# Patient Record
Sex: FEMALE | Race: BLACK OR AFRICAN AMERICAN | HISPANIC OR LATINO | Employment: FULL TIME | URBAN - METROPOLITAN AREA
[De-identification: names, ages, dates, MRNs, and addresses within clinical notes are randomized per-mention and may not be internally consistent; named-entity substitution may affect disease eponyms.]

---

## 2022-03-08 ENCOUNTER — APPOINTMENT (OUTPATIENT)
Dept: RADIOLOGY | Facility: MEDICAL CENTER | Age: 32
End: 2022-03-08
Attending: EMERGENCY MEDICINE
Payer: MEDICAID

## 2022-03-08 ENCOUNTER — HOSPITAL ENCOUNTER (EMERGENCY)
Facility: MEDICAL CENTER | Age: 32
End: 2022-03-08
Attending: EMERGENCY MEDICINE
Payer: MEDICAID

## 2022-03-08 VITALS
DIASTOLIC BLOOD PRESSURE: 79 MMHG | HEART RATE: 79 BPM | HEIGHT: 60 IN | BODY MASS INDEX: 31.21 KG/M2 | WEIGHT: 158.95 LBS | SYSTOLIC BLOOD PRESSURE: 122 MMHG | RESPIRATION RATE: 15 BRPM | TEMPERATURE: 98.1 F | OXYGEN SATURATION: 98 %

## 2022-03-08 DIAGNOSIS — R25.2 MUSCLE CRAMPS: ICD-10-CM

## 2022-03-08 LAB
ALBUMIN SERPL BCP-MCNC: 4.2 G/DL (ref 3.2–4.9)
ALBUMIN/GLOB SERPL: 1.4 G/DL
ALP SERPL-CCNC: 56 U/L (ref 30–99)
ALT SERPL-CCNC: 23 U/L (ref 2–50)
ANION GAP SERPL CALC-SCNC: 11 MMOL/L (ref 7–16)
AST SERPL-CCNC: 22 U/L (ref 12–45)
BASOPHILS # BLD AUTO: 0.4 % (ref 0–1.8)
BASOPHILS # BLD: 0.02 K/UL (ref 0–0.12)
BILIRUB SERPL-MCNC: 0.2 MG/DL (ref 0.1–1.5)
BUN SERPL-MCNC: 9 MG/DL (ref 8–22)
CA-I SERPL-SCNC: 1.2 MMOL/L (ref 1.1–1.3)
CALCIUM SERPL-MCNC: 9.1 MG/DL (ref 8.4–10.2)
CHLORIDE SERPL-SCNC: 102 MMOL/L (ref 96–112)
CO2 SERPL-SCNC: 22 MMOL/L (ref 20–33)
CREAT SERPL-MCNC: 0.38 MG/DL (ref 0.5–1.4)
EOSINOPHIL # BLD AUTO: 0.22 K/UL (ref 0–0.51)
EOSINOPHIL NFR BLD: 3.9 % (ref 0–6.9)
ERYTHROCYTE [DISTWIDTH] IN BLOOD BY AUTOMATED COUNT: 41.9 FL (ref 35.9–50)
GLOBULIN SER CALC-MCNC: 3.1 G/DL (ref 1.9–3.5)
GLUCOSE SERPL-MCNC: 92 MG/DL (ref 65–99)
HCT VFR BLD AUTO: 34.6 % (ref 37–47)
HGB BLD-MCNC: 11.6 G/DL (ref 12–16)
IMM GRANULOCYTES # BLD AUTO: 0.01 K/UL (ref 0–0.11)
IMM GRANULOCYTES NFR BLD AUTO: 0.2 % (ref 0–0.9)
LYMPHOCYTES # BLD AUTO: 2.44 K/UL (ref 1–4.8)
LYMPHOCYTES NFR BLD: 43.7 % (ref 22–41)
MAGNESIUM SERPL-MCNC: 2.1 MG/DL (ref 1.5–2.5)
MCH RBC QN AUTO: 26.7 PG (ref 27–33)
MCHC RBC AUTO-ENTMCNC: 33.5 G/DL (ref 33.6–35)
MCV RBC AUTO: 79.5 FL (ref 81.4–97.8)
MONOCYTES # BLD AUTO: 0.41 K/UL (ref 0–0.85)
MONOCYTES NFR BLD AUTO: 7.3 % (ref 0–13.4)
NEUTROPHILS # BLD AUTO: 2.48 K/UL (ref 2–7.15)
NEUTROPHILS NFR BLD: 44.5 % (ref 44–72)
NRBC # BLD AUTO: 0 K/UL
NRBC BLD-RTO: 0 /100 WBC
PLATELET # BLD AUTO: 313 K/UL (ref 164–446)
PMV BLD AUTO: 8.7 FL (ref 9–12.9)
POTASSIUM SERPL-SCNC: 3.8 MMOL/L (ref 3.6–5.5)
PROT SERPL-MCNC: 7.3 G/DL (ref 6–8.2)
RBC # BLD AUTO: 4.35 M/UL (ref 4.2–5.4)
SODIUM SERPL-SCNC: 135 MMOL/L (ref 135–145)
WBC # BLD AUTO: 5.6 K/UL (ref 4.8–10.8)

## 2022-03-08 PROCEDURE — 80053 COMPREHEN METABOLIC PANEL: CPT

## 2022-03-08 PROCEDURE — 85025 COMPLETE CBC W/AUTO DIFF WBC: CPT

## 2022-03-08 PROCEDURE — 36415 COLL VENOUS BLD VENIPUNCTURE: CPT

## 2022-03-08 PROCEDURE — 99283 EMERGENCY DEPT VISIT LOW MDM: CPT

## 2022-03-08 PROCEDURE — 82330 ASSAY OF CALCIUM: CPT

## 2022-03-08 PROCEDURE — 83735 ASSAY OF MAGNESIUM: CPT

## 2022-03-08 PROCEDURE — 93970 EXTREMITY STUDY: CPT

## 2022-03-08 RX ORDER — NAPROXEN 250 MG/1
500 TABLET ORAL PRN
Status: SHIPPED | COMMUNITY
End: 2022-04-04

## 2022-03-08 NOTE — ED PROVIDER NOTES
"ED Provider Note    CHIEF COMPLAINT  Chief Complaint   Patient presents with   • Leg Pain     Pt presents to the ed from home for bilateral intermittent leg cramping x 2-3 weeks          HPI    Primary care provider: No primary care provider on file.   History obtained from: Patient  History limited by: None     Salud Nathan is a 31 y.o. female who presents to the ED complaining of intermittent bilateral lower extremity muscle cramps that has been ongoing for about 2 to 3 weeks.  She reports that certain movements or positions seem to trigger the muscle cramps.  She was working on the computer tonight and had put her legs up and started cramping and she had to put her legs down.  She also reports that she \"loses feeling\" in her legs intermittently.  She did have a fracture to her right foot about 3 months ago but otherwise no recent injury or trauma.  No history of blood clots.  She denies possibility of pregnancy with history of tubal ligation.  She has not had any fever/chills/chest pain/shortness of breath or difficulty breathing/abdominal pain/nausea/vomiting/diarrhea/dysuria/rash.    REVIEW OF SYSTEMS  Please see HPI for pertinent positives/negatives.  All other systems reviewed and are negative.     PAST MEDICAL HISTORY  Past Medical History:   Diagnosis Date   • Depression    • Insomnia due to anxiety and fear    • Psychiatric disorder         SURGICAL HISTORY  Past Surgical History:   Procedure Laterality Date   • TUBAL LIGATION          SOCIAL HISTORY  Social History     Tobacco Use   • Smoking status: Current Every Day Smoker   • Smokeless tobacco: Never Used   Vaping Use   • Vaping Use: Never used   Substance and Sexual Activity   • Alcohol use: Not Currently   • Drug use: Never   • Sexual activity: Not on file        FAMILY HISTORY  History reviewed. No pertinent family history.     CURRENT MEDICATIONS  Home Medications     Reviewed by Jed Knott R.N. (Registered Nurse) on 03/08/22 at 0318  " Med List Status: Complete   Medication Last Dose Status   naproxen (NAPROSYN) 250 MG Tab  Active                 ALLERGIES  No Known Allergies     PHYSICAL EXAM  VITAL SIGNS: /79   Pulse 79   Temp 36.7 °C (98.1 °F) (Temporal)   Resp 15   Ht 1.524 m (5')   Wt 72.1 kg (158 lb 15.2 oz)   SpO2 98%   BMI 31.04 kg/m²  @[place][[id number]::@     Pulse ox interpretation: 100% I interpret this pulse ox as normal     Constitutional: Well developed, well nourished, alert in no apparent distress, nontoxic appearance    HENT: No external signs of trauma, normocephalic, mask on due to COVID-19 pandemic  Eyes: PERRL, conjunctiva without erythema, no discharge, no icterus    Neck: Soft and supple, trachea midline, no stridor, no tenderness, no LAD, no JVD, good ROM    Cardiovascular: Regular rate and rhythm, no murmurs/rubs/gallops, strong distal pulses and good perfusion    Thorax & Lungs: No respiratory distress, CTAB   Abdomen: Soft, nontender, nondistended, no guarding, no rebound, normal BS    Back: No CVAT    Extremities: No cyanosis, no edema/warmth/crepitus/tenseness/palpable cords, no gross deformity, good ROM, no apparent tenderness, intact distal pulses with brisk cap refill    Skin: Warm, dry, no pallor/cyanosis, no rash noted    Lymphatic: No lymphadenopathy noted    Neuro: A/O times 3, no focal deficits noted, ambulating without difficulty  Psychiatric: Cooperative      DIAGNOSTIC STUDIES / PROCEDURES        LABS  All labs reviewed by me.     Results for orders placed or performed during the hospital encounter of 03/08/22   CBC WITH DIFFERENTIAL   Result Value Ref Range    WBC 5.6 4.8 - 10.8 K/uL    RBC 4.35 4.20 - 5.40 M/uL    Hemoglobin 11.6 (L) 12.0 - 16.0 g/dL    Hematocrit 34.6 (L) 37.0 - 47.0 %    MCV 79.5 (L) 81.4 - 97.8 fL    MCH 26.7 (L) 27.0 - 33.0 pg    MCHC 33.5 (L) 33.6 - 35.0 g/dL    RDW 41.9 35.9 - 50.0 fL    Platelet Count 313 164 - 446 K/uL    MPV 8.7 (L) 9.0 - 12.9 fL    Neutrophils-Polys 44.50  44.00 - 72.00 %    Lymphocytes 43.70 (H) 22.00 - 41.00 %    Monocytes 7.30 0.00 - 13.40 %    Eosinophils 3.90 0.00 - 6.90 %    Basophils 0.40 0.00 - 1.80 %    Immature Granulocytes 0.20 0.00 - 0.90 %    Nucleated RBC 0.00 /100 WBC    Neutrophils (Absolute) 2.48 2.00 - 7.15 K/uL    Lymphs (Absolute) 2.44 1.00 - 4.80 K/uL    Monos (Absolute) 0.41 0.00 - 0.85 K/uL    Eos (Absolute) 0.22 0.00 - 0.51 K/uL    Baso (Absolute) 0.02 0.00 - 0.12 K/uL    Immature Granulocytes (abs) 0.01 0.00 - 0.11 K/uL    NRBC (Absolute) 0.00 K/uL   COMP METABOLIC PANEL   Result Value Ref Range    Sodium 135 135 - 145 mmol/L    Potassium 3.8 3.6 - 5.5 mmol/L    Chloride 102 96 - 112 mmol/L    Co2 22 20 - 33 mmol/L    Anion Gap 11.0 7.0 - 16.0    Glucose 92 65 - 99 mg/dL    Bun 9 8 - 22 mg/dL    Creatinine 0.38 (L) 0.50 - 1.40 mg/dL    Calcium 9.1 8.4 - 10.2 mg/dL    AST(SGOT) 22 12 - 45 U/L    ALT(SGPT) 23 2 - 50 U/L    Alkaline Phosphatase 56 30 - 99 U/L    Total Bilirubin 0.2 0.1 - 1.5 mg/dL    Albumin 4.2 3.2 - 4.9 g/dL    Total Protein 7.3 6.0 - 8.2 g/dL    Globulin 3.1 1.9 - 3.5 g/dL    A-G Ratio 1.4 g/dL   MAGNESIUM   Result Value Ref Range    Magnesium 2.1 1.5 - 2.5 mg/dL   IONIZED CALCIUM   Result Value Ref Range    Ionized Calcium 1.20 1.10 - 1.30 mmol/L   ESTIMATED GFR   Result Value Ref Range    GFR If African American >60 >60 mL/min/1.73 m 2    GFR If Non African American >60 >60 mL/min/1.73 m 2        RADIOLOGY  The radiologist's interpretation of all radiological studies have been reviewed by me.     US-EXTREMITY VENOUS LOWER BILAT   Final Result      No evidence of bilateral lower extremity deep venous thrombosis.                         COURSE & MEDICAL DECISION MAKING  Nursing notes, VS, PMSFHx reviewed in chart.     Review of past medical records shows the patient without prior visits to this ED.      Differential diagnoses considered include but are not limited to: Arthritis, bursitis, tendonitis, DVT/vascular occlusion,  radiculopathy, neuropathy, electrolyte abnormality       History and physical exam as above.  Labs are fairly unremarkable except for mild anemia.  Bilateral lower extremity ultrasound without evidence for DVT.  I discussed the findings with the patient.  This is a pleasant well-appearing patient in no acute distress and nontoxic in appearance and has been clinically stable during her ED stay.  At this point, no apparent emergent condition such as DVT/vascular occlusion or evidence for infection or compartment syndrome.  Patient was advised on outpatient follow-up and given return to ED precautions.  She verbalized understanding and agreed with plan of care with no further questions or concerns.      The patient is referred to a primary physician for blood pressure management, diabetic screening, and for all other preventative health concerns.       FINAL IMPRESSION  1. Muscle cramps Acute          DISPOSITION  Patient will be discharged home in stable condition.       FOLLOW UP  Please follow-up with your doctor    Call today      [facility], Emergency Dept  [address]  [place] [address]  [phone]    If symptoms worsen         OUTPATIENT MEDICATIONS  Discharge Medication List as of 3/8/2022  4:36 AM             Electronically signed by: Amaury Smith D.O., 3/8/2022 3:18 AM      Portions of this record were made with voice recognition software.  Despite my review, spelling/grammar/context errors may still remain.  Interpretation of this chart should be taken in this context.

## 2022-04-01 ENCOUNTER — HOSPITAL ENCOUNTER (EMERGENCY)
Facility: MEDICAL CENTER | Age: 32
End: 2022-04-01
Payer: MEDICAID

## 2022-04-01 VITALS
HEART RATE: 78 BPM | DIASTOLIC BLOOD PRESSURE: 84 MMHG | HEIGHT: 65 IN | BODY MASS INDEX: 26.82 KG/M2 | TEMPERATURE: 97 F | SYSTOLIC BLOOD PRESSURE: 140 MMHG | OXYGEN SATURATION: 98 % | WEIGHT: 161 LBS | RESPIRATION RATE: 19 BRPM

## 2022-04-01 DIAGNOSIS — M79.641 BILATERAL HAND PAIN: ICD-10-CM

## 2022-04-01 DIAGNOSIS — M79.642 BILATERAL HAND PAIN: ICD-10-CM

## 2022-04-01 PROCEDURE — 99283 EMERGENCY DEPT VISIT LOW MDM: CPT

## 2022-04-01 PROCEDURE — A9270 NON-COVERED ITEM OR SERVICE: HCPCS | Performed by: STUDENT IN AN ORGANIZED HEALTH CARE EDUCATION/TRAINING PROGRAM

## 2022-04-01 PROCEDURE — 700102 HCHG RX REV CODE 250 W/ 637 OVERRIDE(OP): Performed by: STUDENT IN AN ORGANIZED HEALTH CARE EDUCATION/TRAINING PROGRAM

## 2022-04-01 RX ORDER — IBUPROFEN 600 MG/1
600 TABLET ORAL ONCE
Status: COMPLETED | OUTPATIENT
Start: 2022-04-01 | End: 2022-04-01

## 2022-04-01 RX ADMIN — IBUPROFEN 600 MG: 600 TABLET ORAL at 02:37

## 2022-04-01 ASSESSMENT — PAIN DESCRIPTION - PAIN TYPE
TYPE: ACUTE PAIN;CHRONIC PAIN
TYPE: ACUTE PAIN;CHRONIC PAIN
TYPE: ACUTE PAIN

## 2022-04-01 ASSESSMENT — FIBROSIS 4 INDEX: FIB4 SCORE: 0.45

## 2022-04-01 NOTE — ED NOTES
Patient fitted with bilateral velcro wrist splints. Patient given PO ibuprofen per MAR.    Discharge instructions reviewed with patient. AVS signed by patient. No new prescriptions. Patient understands need for follow-up appointments with healthcare team and to return to ED for worsening symptoms. All questions answered at this time. Patient ambulated to exit with belongings. Patient in stable condition with no signs of distress. Patient agreeable to discharge instructions.

## 2022-04-01 NOTE — DISCHARGE INSTRUCTIONS
Take the following medications for pain/fever at home:  Acetaminophen (Tylenol): Take 650 mg (2 regular strength) every 6 hours. Do not take more than 3,000mg in a 24 hour period.   Ibuprofen: Take 400-600 mg (2-3 regular strength) every 6 hours. Take with food.   Alternate the two medications and you can take one of them every 3 hours.       As we discussed, please use the splint we have provided for support of your right hand to see if this improves the symptoms.  You may also get another splint at the [facility] if you feel like the splint is helping the right hand to use on the left hand.    Please schedule follow-up appointment with orthopedics for further evaluation of your symptoms.  Return to the emergency department if you develop fevers, joint swelling or other concerns.  We have also placed a referral to help you establish a primary care doctor for basic labs and evaluation if orthopedics is more concerned for a rheumatologic problem.

## 2022-04-01 NOTE — ED PROVIDER NOTES
ED Provider Note    CHIEF COMPLAINT  Chief Complaint   Patient presents with   • Digit Pain     Left hand joints hurt when flexed, pt denies any injury to area          HPI  Salud Nathan is a 31 y.o. female who presents with bilateral hand pain that has been slowly worsening over the last 3 weeks.  Patient states pain is intermittent, worse on the right but over the last several days has also started hurting on the left.  She states the pain is diffuse across her hand joints, is not sure if she has had swelling.  Pain is worse in the evenings.  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  She denies fevers.  She reports there is a family history of arthritis but she is not sure what kind.  She denies any chills or night sweats.  Denies trauma to her hands.  Patient states she said does sometimes sleep on her arms at night.  Denies new or worsening neck or back pain.  Denies pain in any other joints at this time.  States the pain is the worst in the fourth finger, reports sensation of mild tingling at times of all fingers.    Of note on chart review patient was seen in the emergency department for leg cramping 3/8/2022 at the time had basic labs done which showed no significant electrolyte abnormality and normal renal function.    REVIEW OF SYSTEMS  See HPI for further details. All other systems are negative.     PAST MEDICAL HISTORY   has a past medical history of Depression, Insomnia due to anxiety and fear, and Psychiatric disorder.    SOCIAL HISTORY  Social History     Tobacco Use   • Smoking status: Current Every Day Smoker   • Smokeless tobacco: Never Used   Vaping Use   • Vaping Use: Never used   Substance and Sexual Activity   • Alcohol use: Not Currently   • Drug use: Never   • Sexual activity: Not on file       SURGICAL HISTORY   has a past surgical history that includes tubal ligation.    CURRENT MEDICATIONS  Home Medications    **Home  "medications have not yet been reviewed for this encounter**         ALLERGIES  No Known Allergies    PHYSICAL EXAM  VITAL SIGNS: /84   Pulse 79   Temp 36.1 °C (97 °F) (Temporal)   Resp 19   Ht 1.651 m (5' 5\")   Wt 73 kg (161 lb)   SpO2 98%   BMI 26.79 kg/m²    Pulse ox interpretation: I interpret this pulse ox as normal.  Constitutional: Alert in no apparent distress.  HENT: Normocephalic, Atraumatic, Bilateral external ears normal. Nose normal.   Eyes: Pupils are equal and reactive. Conjunctiva normal, non-icteric.   Heart: Regular rate and rythm, no murmurs.    Lungs: Clear to auscultation bilaterally.  MSK: No obvious joint swelling or erythema of bilateral hands.  No focal tenderness.  Moves all fingers normally. Median, radian, ulnar, AIN movement intact bilaterally.  Intact distal sensation of all fingers.  Slight weakness bilaterally with thumb and fifth digit opposition.  Patient has difficulty with Phalen test.  No worsening of symptoms with Tinel test. 2+ todd radial pulse.  Skin: Warm, Dry, No erythema, No rash.   Neurologic: Alert, Grossly non-focal.   Psychiatric: Affect normal, Judgment normal, Mood normal, Appears appropriate and not intoxicated.           COURSE & MEDICAL DECISION MAKING  Pertinent Labs & Imaging studies reviewed. (See chart for details)    31-year-old female presenting with 3 weeks of slowly worsening bilateral hand pain.  Initially started on her dominant hand.  normal vital signs.  On exam she does have slight weakness with opposition is most concerning for possible developing nerve impingement or carpal tunnel syndrome.  Patient given wrist support for the worse hand and instructed on Tylenol and ibuprofen at home.  At this time there is no fevers, significant swelling to make me concerned for septic joint.  Patient does have family history of arthritis, but at this time no labs indicated as this would not  would be best with supportive care with " Tylenol and ibuprofen at this time and outpatient follow-up for further evaluation.  Considered electrolyte abnormality, but patient was just seen in the ED 3 weeks ago for muscle cramping at the time had normal electrolytes.  Feel no indication for repeat at this time.  Referrals placed for both PCP and orthopedics.  There has been no trauma to her hands, at this time feel x-rays are not indicated.  Discharged home with return precautions.    The patient will not drink alcohol nor drive with prescribed medications. The patient will return for worsening symptoms and is stable at the time of discharge. The patient verbalizes understanding and will comply.    FINAL IMPRESSION  1. Bilateral hand pain  Referral to Orthopedics    Referral to establish with [facility] PCP            Electronically signed by: Vidya Espinoza M.D., 4/1/2022 2:11 AM

## 2022-04-01 NOTE — ED TRIAGE NOTES
"Chief Complaint   Patient presents with   • Digit Pain     Left hand joints hurt when flexed, pt denies any injury to area        /89   Pulse 79   Temp 36.1 °C (97 °F) (Temporal)   Resp 20   Ht 1.651 m (5' 5\")   Wt 73 kg (161 lb)   SpO2 98%   BMI 26.79 kg/m²       "

## 2022-04-03 SDOH — ECONOMIC STABILITY: TRANSPORTATION INSECURITY
IN THE PAST 12 MONTHS, HAS THE LACK OF TRANSPORTATION KEPT YOU FROM MEDICAL APPOINTMENTS OR FROM GETTING MEDICATIONS?: NO

## 2022-04-03 SDOH — HEALTH STABILITY: PHYSICAL HEALTH: ON AVERAGE, HOW MANY DAYS PER WEEK DO YOU ENGAGE IN MODERATE TO STRENUOUS EXERCISE (LIKE A BRISK WALK)?: 0 DAYS

## 2022-04-03 SDOH — ECONOMIC STABILITY: FOOD INSECURITY: WITHIN THE PAST 12 MONTHS, YOU WORRIED THAT YOUR FOOD WOULD RUN OUT BEFORE YOU GOT MONEY TO BUY MORE.: NEVER TRUE

## 2022-04-03 SDOH — HEALTH STABILITY: PHYSICAL HEALTH: ON AVERAGE, HOW MANY MINUTES DO YOU ENGAGE IN EXERCISE AT THIS LEVEL?: 0 MIN

## 2022-04-03 SDOH — ECONOMIC STABILITY: HOUSING INSECURITY
IN THE LAST 12 MONTHS, WAS THERE A TIME WHEN YOU DID NOT HAVE A STEADY PLACE TO SLEEP OR SLEPT IN A SHELTER (INCLUDING NOW)?: NO

## 2022-04-03 SDOH — ECONOMIC STABILITY: HOUSING INSECURITY: IN THE LAST 12 MONTHS, HOW MANY PLACES HAVE YOU LIVED?: 2

## 2022-04-03 SDOH — ECONOMIC STABILITY: INCOME INSECURITY: HOW HARD IS IT FOR YOU TO PAY FOR THE VERY BASICS LIKE FOOD, HOUSING, MEDICAL CARE, AND HEATING?: SOMEWHAT HARD

## 2022-04-03 SDOH — ECONOMIC STABILITY: INCOME INSECURITY: IN THE LAST 12 MONTHS, WAS THERE A TIME WHEN YOU WERE NOT ABLE TO PAY THE MORTGAGE OR RENT ON TIME?: NO

## 2022-04-03 SDOH — ECONOMIC STABILITY: TRANSPORTATION INSECURITY
IN THE PAST 12 MONTHS, HAS LACK OF TRANSPORTATION KEPT YOU FROM MEETINGS, WORK, OR FROM GETTING THINGS NEEDED FOR DAILY LIVING?: NO

## 2022-04-03 SDOH — ECONOMIC STABILITY: TRANSPORTATION INSECURITY
IN THE PAST 12 MONTHS, HAS LACK OF RELIABLE TRANSPORTATION KEPT YOU FROM MEDICAL APPOINTMENTS, MEETINGS, WORK OR FROM GETTING THINGS NEEDED FOR DAILY LIVING?: NO

## 2022-04-03 SDOH — ECONOMIC STABILITY: FOOD INSECURITY: WITHIN THE PAST 12 MONTHS, THE FOOD YOU BOUGHT JUST DIDN'T LAST AND YOU DIDN'T HAVE MONEY TO GET MORE.: NEVER TRUE

## 2022-04-03 SDOH — HEALTH STABILITY: MENTAL HEALTH
STRESS IS WHEN SOMEONE FEELS TENSE, NERVOUS, ANXIOUS, OR CAN'T SLEEP AT NIGHT BECAUSE THEIR MIND IS TROUBLED. HOW STRESSED ARE YOU?: RATHER MUCH

## 2022-04-03 ASSESSMENT — SOCIAL DETERMINANTS OF HEALTH (SDOH)
DO YOU BELONG TO ANY CLUBS OR ORGANIZATIONS SUCH AS CHURCH GROUPS UNIONS, FRATERNAL OR ATHLETIC GROUPS, OR SCHOOL GROUPS?: NO
WITHIN THE PAST 12 MONTHS, YOU WORRIED THAT YOUR FOOD WOULD RUN OUT BEFORE YOU GOT THE MONEY TO BUY MORE: NEVER TRUE
HOW MANY DRINKS CONTAINING ALCOHOL DO YOU HAVE ON A TYPICAL DAY WHEN YOU ARE DRINKING: 1 OR 2
HOW OFTEN DO YOU HAVE A DRINK CONTAINING ALCOHOL: MONTHLY OR LESS
IN A TYPICAL WEEK, HOW MANY TIMES DO YOU TALK ON THE PHONE WITH FAMILY, FRIENDS, OR NEIGHBORS?: THREE TIMES A WEEK
HOW OFTEN DO YOU GET TOGETHER WITH FRIENDS OR RELATIVES?: ONCE A WEEK
ARE YOU MARRIED, WIDOWED, DIVORCED, SEPARATED, NEVER MARRIED, OR LIVING WITH A PARTNER?: NEVER MARRIED
DO YOU BELONG TO ANY CLUBS OR ORGANIZATIONS SUCH AS CHURCH GROUPS UNIONS, FRATERNAL OR ATHLETIC GROUPS, OR SCHOOL GROUPS?: NO
HOW OFTEN DO YOU ATTENT MEETINGS OF THE CLUB OR ORGANIZATION YOU BELONG TO?: NEVER
ARE YOU MARRIED, WIDOWED, DIVORCED, SEPARATED, NEVER MARRIED, OR LIVING WITH A PARTNER?: NEVER MARRIED
HOW OFTEN DO YOU ATTEND CHURCH OR RELIGIOUS SERVICES?: NEVER
IN A TYPICAL WEEK, HOW MANY TIMES DO YOU TALK ON THE PHONE WITH FAMILY, FRIENDS, OR NEIGHBORS?: THREE TIMES A WEEK
HOW OFTEN DO YOU ATTENT MEETINGS OF THE CLUB OR ORGANIZATION YOU BELONG TO?: NEVER
HOW OFTEN DO YOU HAVE SIX OR MORE DRINKS ON ONE OCCASION: NEVER
HOW HARD IS IT FOR YOU TO PAY FOR THE VERY BASICS LIKE FOOD, HOUSING, MEDICAL CARE, AND HEATING?: SOMEWHAT HARD
HOW OFTEN DO YOU GET TOGETHER WITH FRIENDS OR RELATIVES?: ONCE A WEEK
HOW OFTEN DO YOU ATTEND CHURCH OR RELIGIOUS SERVICES?: NEVER

## 2022-04-03 ASSESSMENT — LIFESTYLE VARIABLES
HOW OFTEN DO YOU HAVE A DRINK CONTAINING ALCOHOL: MONTHLY OR LESS
HOW OFTEN DO YOU HAVE SIX OR MORE DRINKS ON ONE OCCASION: NEVER
HOW MANY STANDARD DRINKS CONTAINING ALCOHOL DO YOU HAVE ON A TYPICAL DAY: 1 OR 2

## 2022-04-04 ENCOUNTER — OFFICE VISIT (OUTPATIENT)
Dept: INTERNAL MEDICINE | Facility: OTHER | Age: 32
End: 2022-04-04
Attending: STUDENT IN AN ORGANIZED HEALTH CARE EDUCATION/TRAINING PROGRAM
Payer: MEDICAID

## 2022-04-04 VITALS
WEIGHT: 158.2 LBS | TEMPERATURE: 98.6 F | OXYGEN SATURATION: 95 % | HEART RATE: 92 BPM | BODY MASS INDEX: 31.06 KG/M2 | DIASTOLIC BLOOD PRESSURE: 60 MMHG | SYSTOLIC BLOOD PRESSURE: 110 MMHG | HEIGHT: 60 IN

## 2022-04-04 DIAGNOSIS — Z12.4 CERVICAL CANCER SCREENING: ICD-10-CM

## 2022-04-04 DIAGNOSIS — F41.8 DEPRESSION WITH ANXIETY: ICD-10-CM

## 2022-04-04 DIAGNOSIS — M25.561 ACUTE PAIN OF RIGHT KNEE: ICD-10-CM

## 2022-04-04 DIAGNOSIS — E66.9 OBESITY (BMI 30-39.9): ICD-10-CM

## 2022-04-04 DIAGNOSIS — M79.641 RIGHT HAND PAIN: ICD-10-CM

## 2022-04-04 DIAGNOSIS — Z23 NEED FOR PNEUMOCOCCAL VACCINE: ICD-10-CM

## 2022-04-04 DIAGNOSIS — F17.210 CIGARETTE NICOTINE DEPENDENCE WITHOUT COMPLICATION: ICD-10-CM

## 2022-04-04 PROCEDURE — 99203 OFFICE O/P NEW LOW 30 MIN: CPT | Mod: 25,GE | Performed by: GENERAL PRACTICE

## 2022-04-04 PROCEDURE — 90732 PPSV23 VACC 2 YRS+ SUBQ/IM: CPT | Performed by: STUDENT IN AN ORGANIZED HEALTH CARE EDUCATION/TRAINING PROGRAM

## 2022-04-04 PROCEDURE — 90471 IMMUNIZATION ADMIN: CPT | Performed by: STUDENT IN AN ORGANIZED HEALTH CARE EDUCATION/TRAINING PROGRAM

## 2022-04-04 RX ORDER — IBUPROFEN 200 MG
200 TABLET ORAL EVERY 6 HOURS PRN
COMMUNITY

## 2022-04-04 RX ORDER — HYDROXYZINE HYDROCHLORIDE 25 MG/1
25 TABLET, FILM COATED ORAL 2 TIMES DAILY PRN
Qty: 30 TABLET | Refills: 1 | Status: SHIPPED | OUTPATIENT
Start: 2022-04-04 | End: 2022-08-12

## 2022-04-04 ASSESSMENT — ANXIETY QUESTIONNAIRES
3. WORRYING TOO MUCH ABOUT DIFFERENT THINGS: NOT AT ALL
7. FEELING AFRAID AS IF SOMETHING AWFUL MIGHT HAPPEN: NOT AT ALL
IF YOU CHECKED OFF ANY PROBLEMS ON THIS QUESTIONNAIRE, HOW DIFFICULT HAVE THESE PROBLEMS MADE IT FOR YOU TO DO YOUR WORK, TAKE CARE OF THINGS AT HOME, OR GET ALONG WITH OTHER PEOPLE: NOT DIFFICULT AT ALL
1. FEELING NERVOUS, ANXIOUS, OR ON EDGE: SEVERAL DAYS
5. BEING SO RESTLESS THAT IT IS HARD TO SIT STILL: NOT AT ALL
4. TROUBLE RELAXING: NOT AT ALL
GAD7 TOTAL SCORE: 2
6. BECOMING EASILY ANNOYED OR IRRITABLE: SEVERAL DAYS
2. NOT BEING ABLE TO STOP OR CONTROL WORRYING: NOT AT ALL

## 2022-04-04 ASSESSMENT — ENCOUNTER SYMPTOMS
CONSTIPATION: 0
FEVER: 0
FALLS: 0
VOMITING: 0
BLURRED VISION: 0
SHORTNESS OF BREATH: 0
ABDOMINAL PAIN: 0
WEIGHT LOSS: 0
NERVOUS/ANXIOUS: 1
DIARRHEA: 0
CHILLS: 0
HEADACHES: 0
DIZZINESS: 0
NAUSEA: 0
WHEEZING: 0
COUGH: 0
WEAKNESS: 0
MYALGIAS: 0
DOUBLE VISION: 0
PALPITATIONS: 0
HEARTBURN: 0
DEPRESSION: 0

## 2022-04-04 ASSESSMENT — LIFESTYLE VARIABLES: SUBSTANCE_ABUSE: 0

## 2022-04-04 ASSESSMENT — FIBROSIS 4 INDEX: FIB4 SCORE: 0.45

## 2022-04-04 ASSESSMENT — PATIENT HEALTH QUESTIONNAIRE - PHQ9: CLINICAL INTERPRETATION OF PHQ2 SCORE: 0

## 2022-04-04 NOTE — PROGRESS NOTES
New Patient to Establish    Reason to establish: New patient to establish    CC: new    HPI: 31 year old female,presents to establish care. Was previously seen in [place] by PCP, but has not been seen for two years.      Smoker: 1/2 PPD for 13 years.not ready to quit.       Pap smear:has not had a pap smear in the past 3 years.history of HPV with LEEP 2015.  Tubal ligation:2015  Tdap:due  Pneumovax: due    Has had right pain for 1month, no trauma, 2nd,3rd,4th fingersr MTP joint. takne Tylenol and Motrin without relief.    Right knee pain for one month.no trauma.not seen for this.worse after work standing working at [employer].    Depression with anxiety: using as needed. Previously on Benzodiazepines. Has been on Hydroxyzine in the past that worked better. GAD7: 2, PHQ9-negative.    Patient Active Problem List    Diagnosis Date Noted   • Depression with anxiety 04/04/2022   • Cigarette nicotine dependence without complication 04/04/2022   • Obesity (BMI 30-39.9) 04/04/2022       Past Medical History:   Diagnosis Date   • Depression    • Insomnia due to anxiety and fear    • Psychiatric disorder        Current Outpatient Medications   Medication Sig Dispense Refill   • ibuprofen (MOTRIN) 200 MG Tab Take 200 mg by mouth every 6 hours as needed.     • hydrOXYzine HCl (ATARAX) 25 MG Tab Take 1 Tablet by mouth 2 times a day as needed for Itching. 30 Tablet 1     No current facility-administered medications for this visit.       Allergies as of 04/04/2022   • (No Known Allergies)       Social History     Tobacco Use   • Smoking status: Current Every Day Smoker     Packs/day: 0.50     Years: 13.00     Pack years: 6.50     Types: Cigarettes   • Smokeless tobacco: Never Used   • Tobacco comment: 1/2 pack a day, states could be less   Vaping Use   • Vaping Use: Never used   Substance Use Topics   • Alcohol use: Yes     Comment: occasional, socially   • Drug use: Never       History reviewed. No pertinent family  history.    Past Surgical History:   Procedure Laterality Date   • LEEP  2015   • TUBAL LIGATION           Review of Systems   Constitutional: Negative for chills, fever, malaise/fatigue and weight loss.   Eyes: Negative for blurred vision and double vision.   Respiratory: Negative for cough, shortness of breath and wheezing.    Cardiovascular: Negative for chest pain and palpitations.   Gastrointestinal: Negative for abdominal pain, constipation, diarrhea, heartburn, nausea and vomiting.   Genitourinary: Negative for dysuria, frequency and urgency.   Musculoskeletal: Positive for joint pain. Negative for falls and myalgias.   Skin: Negative for rash.   Neurological: Negative for dizziness, weakness and headaches.   Psychiatric/Behavioral: Negative for depression, substance abuse and suicidal ideas. The patient is nervous/anxious.          /60 (BP Location: Left arm, Patient Position: Sitting, BP Cuff Size: Adult)   Pulse 92   Temp 37 °C (98.6 °F) (Temporal)   Ht 1.524 m (5')   Wt 71.8 kg (158 lb 3.2 oz)   SpO2 95%   BMI 30.90 kg/m²       Physical Exam  Physical Exam  Vitals and nursing note reviewed.   Constitutional:       General: She is not in acute distress.     Appearance: Normal appearance. She is not ill-appearing.   HENT:      Head: Normocephalic and atraumatic.   Eyes:      Conjunctiva/sclera: Conjunctivae normal.   Cardiovascular:      Rate and Rhythm: Normal rate and regular rhythm.      Pulses: Normal pulses.      Heart sounds: Normal heart sounds. No murmur heard.    No friction rub. No gallop.   Pulmonary:      Effort: Pulmonary effort is normal. No respiratory distress.      Breath sounds: Normal breath sounds. No wheezing, rhonchi or rales.   Musculoskeletal:      Right hand: Tenderness and bony tenderness present. No swelling, deformity or lacerations. Normal range of motion. Normal strength. Normal sensation. There is no disruption of two-point discrimination. Normal capillary refill.  Normal pulse.      Cervical back: Neck supple. No rigidity or tenderness.      Right knee: No deformity, effusion, erythema, ecchymosis, lacerations, bony tenderness or crepitus. Normal range of motion. No tenderness. No LCL laxity, MCL laxity, ACL laxity or PCL laxity. Abnormal patellar mobility. Normal alignment and normal meniscus. Normal pulse.      Instability Tests: Anterior drawer test negative. Posterior drawer test negative. Medial Alexander test negative and lateral Alexander test negative.      Comments: Right hand:mild TTP dorsal and volar aspect of the 2nd,3rd,4th fingers.   Skin:     General: Skin is dry.      Capillary Refill: Capillary refill takes less than 2 seconds.      Findings: No rash.   Neurological:      Mental Status: She is alert and oriented to person, place, and time. Mental status is at baseline.   Psychiatric:         Mood and Affect: Mood normal.         Behavior: Behavior normal.         Note: I have reviewed all pertinent labs and diagnostic tests associated with this visit with specific comments listed under the assessment and plan below    Assessment and Plan    1. Cigarette nicotine dependence without complication  Currently smokes/uses: 1/2 PPD for 13 years. Previous attempts to quit: none> not ready to quit at this time.Will readdress at next appointment.  Spent 3 minutes spent counseling patient regarding quitting smoking to improve overall health.     2. Cervical cancer screening  - Referral to Gynecology    3. Need for pneumococcal vaccine  - PneumoVax (PPSV23) =>3yo    4. Obesity (BMI 30-39.9)  Discussed lifestyle changes  - Lipid Profile; Future  - TSH WITH REFLEX TO FT4; Future  - Comp Metabolic Panel; Future  - CBC WITH DIFFERENTIAL; Future  - HEMOGLOBIN A1C; Future    5. Right hand pain  - DX-HAND 3+ RIGHT; Future  - VELMA W/REFLEX IF POSITIVE  - RHEUMATOID ARTHRITIS FACTOR; Future    6. Acute pain of right knee  suspect mild synovitis or Patellofemoral syndrome, mild  OA.recommend leg elevation after work,ice for 20 minutes, Tylenol 500mg every six hours as needed,avoid NSAIDs.Advised to get a sleeve knee brace.will get imaging.  - DX-KNEE COMPLETE 4+ RIGHT; Future    7. Depression with anxiety  Mild. No SSRI or Psychology referral indicated at this time.  - hydrOXYzine HCl (ATARAX) 25 MG Tab; Take 1 Tablet by mouth 2 times a day as needed for Itching.  Dispense: 30 Tablet; Refill: 1          Followup: Return in about 3 months (around 7/4/2022).        Signed by: Cristo Rudolph Jr., M.D.

## 2022-04-04 NOTE — PATIENT INSTRUCTIONS
"-fasting labs to get  -xray for right knee and hand  -referral to Gynecology. Call our clinic if you have not received an approval in the mail or by phone.  -recommend Tdap booster. Given every 10 years.  -recommend to get a sleeve knee brace  -avoid Ibuprofen or Motrin,Naproxen for pain  -take Tylenol 500mg every six hours as needed for pain  -refilled Hydroxyzine for anxiety    https://www.cdc.gov/vaccines/hcp/vis/vis-statements/tdap.pdf\">   Tdap Vaccine (Tetanus, Diphtheria and Pertussis): What You Need to Know  1. Why get vaccinated?  Tetanus, diphtheria and pertussis are very serious diseases. Tdap vaccine can protect us from these diseases. And, Tdap vaccine given to pregnant women can protect  babies against pertussis..  TETANUS (Lockjaw) is rare in the [place] today. It causes painful muscle tightening and stiffness, usually all over the body.  · It can lead to tightening of muscles in the head and neck so you can't open your mouth, swallow, or sometimes even breathe. Tetanus kills about 1 out of 10 people who are infected even after receiving the best medical care.  DIPHTHERIA is also rare in the [place] today. It can cause a thick coating to form in the back of the throat.  · It can lead to breathing problems, heart failure, paralysis, and death.  PERTUSSIS (Whooping Cough) causes severe coughing spells, which can cause difficulty breathing, vomiting and disturbed sleep.  · It can also lead to weight loss, incontinence, and rib fractures. Up to 2 in 100 adolescents and 5 in 100 adults with pertussis are hospitalized or have complications, which could include pneumonia or death.  These diseases are caused by bacteria. Diphtheria and pertussis are spread from person to person through secretions from coughing or sneezing. Tetanus enters the body through cuts, scratches, or wounds.  Before vaccines, as many as 200,000 cases of diphtheria, 200,000 cases of pertussis, and hundreds of cases of " tetanus, were reported in the [place] each year. Since vaccination began, reports of cases for tetanus and diphtheria have dropped by about 99% and for pertussis by about 80%.  2. Tdap vaccine  Tdap vaccine can protect adolescents and adults from tetanus, diphtheria, and pertussis. One dose of Tdap is routinely given at age 11 or 12. People who did not get Tdap at that age should get it as soon as possible.  Tdap is especially important for healthcare professionals and anyone having close contact with a baby younger than 12 months.  Pregnant women should get a dose of Tdap during every pregnancy, to protect the  from pertussis. Infants are most at risk for severe, life-threatening complications from pertussis.  Another vaccine, called Td, protects against tetanus and diphtheria, but not pertussis. A Td booster should be given every 10 years. Tdap may be given as one of these boosters if you have never gotten Tdap before. Tdap may also be given after a severe cut or burn to prevent tetanus infection.  Your doctor or the person giving you the vaccine can give you more information.  Tdap may safely be given at the same time as other vaccines.  3. Some people should not get this vaccine  · A person who has ever had a life-threatening allergic reaction after a previous dose of any diphtheria, tetanus or pertussis containing vaccine, OR has a severe allergy to any part of this vaccine, should not get Tdap vaccine. Tell the person giving the vaccine about any severe allergies.  · Anyone who had coma or long repeated seizures within 7 days after a childhood dose of DTP or DTaP, or a previous dose of Tdap, should not get Tdap, unless a cause other than the vaccine was found. They can still get Td.  · Talk to your doctor if you:  ? have seizures or another nervous system problem,  ? had severe pain or swelling after any vaccine containing diphtheria, tetanus or pertussis,  ? ever had a condition called  Guillain-Barré Syndrome (GBS),  ? aren't feeling well on the day the shot is scheduled.  4. Risks  With any medicine, including vaccines, there is a chance of side effects. These are usually mild and go away on their own. Serious reactions are also possible but are rare.  Most people who get Tdap vaccine do not have any problems with it.  Mild problems following Tdap  (Did not interfere with activities)  · Pain where the shot was given (about 3 in 4 adolescents or 2 in 3 adults)  · Redness or swelling where the shot was given (about 1 person in 5)  · Mild fever of at least 100.4°F (up to about 1 in 25 adolescents or 1 in 100 adults)  · Headache (about 3 or 4 people in 10)  · Tiredness (about 1 person in 3 or 4)  · Nausea, vomiting, diarrhea, stomach ache (up to 1 in 4 adolescents or 1 in 10 adults)  · Chills, sore joints (about 1 person in 10)  · Body aches (about 1 person in 3 or 4)  · Rash, swollen glands (uncommon)  Moderate problems following Tdap  (Interfered with activities, but did not require medical attention)  · Pain where the shot was given (up to 1 in 5 or 6)  · Redness or swelling where the shot was given (up to about 1 in 16 adolescents or 1 in 12 adults)  · Fever over 102°F (about 1 in 100 adolescents or 1 in 250 adults)  · Headache (about 1 in 7 adolescents or 1 in 10 adults)  · Nausea, vomiting, diarrhea, stomach ache (up to 1 or 3 people in 100)  · Swelling of the entire arm where the shot was given (up to about 1 in 500).  Severe problems following Tdap  (Unable to perform usual activities; required medical attention)  · Swelling, severe pain, bleeding and redness in the arm where the shot was given (rare).  Problems that could happen after any vaccine:  · People sometimes faint after a medical procedure, including vaccination. Sitting or lying down for about 15 minutes can help prevent fainting, and injuries caused by a fall. Tell your doctor if you feel dizzy, or have vision changes or ringing  in the ears.  · Some people get severe pain in the shoulder and have difficulty moving the arm where a shot was given. This happens very rarely.  · Any medication can cause a severe allergic reaction. Such reactions from a vaccine are very rare, estimated at fewer than 1 in a million doses, and would happen within a few minutes to a few hours after the vaccination.  As with any medicine, there is a very remote chance of a vaccine causing a serious injury or death.  The safety of vaccines is always being monitored. For more information, visit: www.cdc.gov/vaccinesafety/  5. What if there is a serious problem?  What should I look for?  · Look for anything that concerns you, such as signs of a severe allergic reaction, very high fever, or unusual behavior.  Signs of a severe allergic reaction can include hives, swelling of the face and throat, difficulty breathing, a fast heartbeat, dizziness, and weakness. These would usually start a few minutes to a few hours after the vaccination.  What should I do?  · If you think it is a severe allergic reaction or other emergency that can't wait, call 9-1-1 or get the person to the nearest hospital. Otherwise, call your doctor.  · Afterward, the reaction should be reported to the Vaccine Adverse Event Reporting System (VAERS). Your doctor might file this report, or you can do it yourself through the VAERS web site at www.vaers.hhs.gov, or by calling 1-847.655.9601.  VAERS does not give medical advice.  6. The National Vaccine Injury Compensation Program  The National Vaccine Injury Compensation Program (VICP) is a federal program that was created to compensate people who may have been injured by certain vaccines.  Persons who believe they may have been injured by a vaccine can learn about the program and about filing a claim by calling 1-883.518.5221 or visiting the VICP website at www.hrsa.gov/vaccinecompensation. There is a time limit to file a claim for compensation.  7. How  can I learn more?  · Ask your doctor. He or she can give you the vaccine package insert or suggest other sources of information.  · Call your local or state health department.  · Contact the Centers for Disease Control and Prevention (CDC):  ? Call 1-208.583.3912 (3-357-MEG-INFO) or  ? Visit CDC's website at www.cdc.gov/vaccines  Vaccine Information Statement Tdap Vaccine (2/24/2015)  This information is not intended to replace advice given to you by your health care provider. Make sure you discuss any questions you have with your health care provider.  Document Released: 06/18/2013 Document Revised: 08/05/2019 Document Reviewed: 08/05/2019  Elsevier Interactive Patient Education © 2020 [employer]

## 2022-04-15 ENCOUNTER — HOSPITAL ENCOUNTER (OUTPATIENT)
Dept: RADIOLOGY | Facility: MEDICAL CENTER | Age: 32
End: 2022-04-15
Attending: GENERAL PRACTICE
Payer: MEDICAID

## 2022-04-15 ENCOUNTER — TELEPHONE (OUTPATIENT)
Dept: INTERNAL MEDICINE | Facility: OTHER | Age: 32
End: 2022-04-15

## 2022-04-15 ENCOUNTER — HOSPITAL ENCOUNTER (OUTPATIENT)
Dept: LAB | Facility: MEDICAL CENTER | Age: 32
End: 2022-04-15
Attending: GENERAL PRACTICE
Payer: MEDICAID

## 2022-04-15 DIAGNOSIS — E66.9 OBESITY (BMI 30-39.9): ICD-10-CM

## 2022-04-15 DIAGNOSIS — M79.641 RIGHT HAND PAIN: ICD-10-CM

## 2022-04-15 DIAGNOSIS — M25.561 ACUTE PAIN OF RIGHT KNEE: ICD-10-CM

## 2022-04-15 LAB
ALBUMIN SERPL BCP-MCNC: 4 G/DL (ref 3.2–4.9)
ALBUMIN/GLOB SERPL: 1.2 G/DL
ALP SERPL-CCNC: 57 U/L (ref 30–99)
ALT SERPL-CCNC: 25 U/L (ref 2–50)
ANION GAP SERPL CALC-SCNC: 10 MMOL/L (ref 7–16)
AST SERPL-CCNC: 23 U/L (ref 12–45)
BASOPHILS # BLD AUTO: 0.5 % (ref 0–1.8)
BASOPHILS # BLD: 0.03 K/UL (ref 0–0.12)
BILIRUB SERPL-MCNC: 0.3 MG/DL (ref 0.1–1.5)
BUN SERPL-MCNC: 10 MG/DL (ref 8–22)
CALCIUM SERPL-MCNC: 8.6 MG/DL (ref 8.4–10.2)
CHLORIDE SERPL-SCNC: 107 MMOL/L (ref 96–112)
CHOLEST SERPL-MCNC: 120 MG/DL (ref 100–199)
CO2 SERPL-SCNC: 19 MMOL/L (ref 20–33)
CREAT SERPL-MCNC: 0.44 MG/DL (ref 0.5–1.4)
EOSINOPHIL # BLD AUTO: 0.16 K/UL (ref 0–0.51)
EOSINOPHIL NFR BLD: 2.8 % (ref 0–6.9)
ERYTHROCYTE [DISTWIDTH] IN BLOOD BY AUTOMATED COUNT: 41.4 FL (ref 35.9–50)
EST. AVERAGE GLUCOSE BLD GHB EST-MCNC: 111 MG/DL
FASTING STATUS PATIENT QL REPORTED: NORMAL
GFR SERPLBLD CREATININE-BSD FMLA CKD-EPI: 132 ML/MIN/1.73 M 2
GLOBULIN SER CALC-MCNC: 3.3 G/DL (ref 1.9–3.5)
GLUCOSE SERPL-MCNC: 100 MG/DL (ref 65–99)
HBA1C MFR BLD: 5.5 % (ref 4–5.6)
HCT VFR BLD AUTO: 35.7 % (ref 37–47)
HDLC SERPL-MCNC: 42 MG/DL
HGB BLD-MCNC: 12 G/DL (ref 12–16)
IMM GRANULOCYTES # BLD AUTO: 0.01 K/UL (ref 0–0.11)
IMM GRANULOCYTES NFR BLD AUTO: 0.2 % (ref 0–0.9)
LDLC SERPL CALC-MCNC: 71 MG/DL
LYMPHOCYTES # BLD AUTO: 1.96 K/UL (ref 1–4.8)
LYMPHOCYTES NFR BLD: 34.3 % (ref 22–41)
MCH RBC QN AUTO: 26.7 PG (ref 27–33)
MCHC RBC AUTO-ENTMCNC: 33.6 G/DL (ref 33.6–35)
MCV RBC AUTO: 79.3 FL (ref 81.4–97.8)
MONOCYTES # BLD AUTO: 0.33 K/UL (ref 0–0.85)
MONOCYTES NFR BLD AUTO: 5.8 % (ref 0–13.4)
NEUTROPHILS # BLD AUTO: 3.22 K/UL (ref 2–7.15)
NEUTROPHILS NFR BLD: 56.4 % (ref 44–72)
NRBC # BLD AUTO: 0 K/UL
NRBC BLD-RTO: 0 /100 WBC
PLATELET # BLD AUTO: 389 K/UL (ref 164–446)
PMV BLD AUTO: 9.1 FL (ref 9–12.9)
POTASSIUM SERPL-SCNC: 3.8 MMOL/L (ref 3.6–5.5)
PROT SERPL-MCNC: 7.3 G/DL (ref 6–8.2)
RBC # BLD AUTO: 4.5 M/UL (ref 4.2–5.4)
RHEUMATOID FACT SER IA-ACNC: <10 IU/ML (ref 0–14)
SODIUM SERPL-SCNC: 136 MMOL/L (ref 135–145)
TRIGL SERPL-MCNC: 36 MG/DL (ref 0–149)
TSH SERPL DL<=0.005 MIU/L-ACNC: 0.8 UIU/ML (ref 0.38–5.33)
WBC # BLD AUTO: 5.7 K/UL (ref 4.8–10.8)

## 2022-04-15 PROCEDURE — 73130 X-RAY EXAM OF HAND: CPT | Mod: RT

## 2022-04-15 PROCEDURE — 86225 DNA ANTIBODY NATIVE: CPT

## 2022-04-15 PROCEDURE — 86431 RHEUMATOID FACTOR QUANT: CPT

## 2022-04-15 PROCEDURE — 84443 ASSAY THYROID STIM HORMONE: CPT

## 2022-04-15 PROCEDURE — 86256 FLUORESCENT ANTIBODY TITER: CPT

## 2022-04-15 PROCEDURE — 80053 COMPREHEN METABOLIC PANEL: CPT

## 2022-04-15 PROCEDURE — 85025 COMPLETE CBC W/AUTO DIFF WBC: CPT

## 2022-04-15 PROCEDURE — 86039 ANTINUCLEAR ANTIBODIES (ANA): CPT

## 2022-04-15 PROCEDURE — 80061 LIPID PANEL: CPT

## 2022-04-15 PROCEDURE — 83036 HEMOGLOBIN GLYCOSYLATED A1C: CPT

## 2022-04-15 PROCEDURE — 86038 ANTINUCLEAR ANTIBODIES: CPT

## 2022-04-15 PROCEDURE — 73564 X-RAY EXAM KNEE 4 OR MORE: CPT | Mod: RT

## 2022-04-15 PROCEDURE — 86235 NUCLEAR ANTIGEN ANTIBODY: CPT

## 2022-04-15 PROCEDURE — 36415 COLL VENOUS BLD VENIPUNCTURE: CPT

## 2022-04-15 NOTE — TELEPHONE ENCOUNTER
Spoke with pt letting her know, pt is still having the pain, and would like to know where we go from here with X-Ray being normal

## 2022-04-16 NOTE — TELEPHONE ENCOUNTER
Still have two labs pending (VELMA, rheumatoid factor). Would have the patient schedule a follow up when I'm in the clinic next month to readdress her pain.

## 2022-04-18 ENCOUNTER — TELEPHONE (OUTPATIENT)
Dept: INTERNAL MEDICINE | Facility: OTHER | Age: 32
End: 2022-04-18
Payer: MEDICAID

## 2022-04-18 LAB — NUCLEAR IGG SER QL IA: DETECTED

## 2022-04-18 NOTE — TELEPHONE ENCOUNTER
----- Message from Cristo Rudolph Jr., M.D. sent at 4/18/2022  7:31 AM PDT -----  Schedule the patient an appointment in May when I'm in the clinic for follow up of her lab results and pain. Thank you.

## 2022-04-21 LAB
ANA INTERPRETIVE COMMENT Q5143: ABNORMAL
ANA PATTERN 2 Q5146: ABNORMAL
ANA PATTERN Q5144: ABNORMAL
ANA TITER 2 Q5147: ABNORMAL
ANA TITER Q5145: ABNORMAL
ANTINUCLEAR ANTIBODY (ANA), HEP-2, IGG Q5142: DETECTED

## 2022-04-22 LAB
DSDNA AB TITR SER CLIF: 188 IU (ref 0–24)
ENA JO1 AB TITR SER: 0 AU/ML (ref 0–40)
ENA SCL70 IGG SER QL: 0 AU/ML (ref 0–40)
ENA SM IGG SER-ACNC: 0 AU/ML (ref 0–40)
ENA SS-B IGG SER IA-ACNC: 0 AU/ML (ref 0–40)
SSA52 R0ENA AB IGG Q0420: 9 AU/ML (ref 0–40)
SSA60 R0ENA AB IGG Q0419: 1 AU/ML (ref 0–40)

## 2022-04-23 ENCOUNTER — TELEPHONE (OUTPATIENT)
Dept: INTERNAL MEDICINE | Facility: OTHER | Age: 32
End: 2022-04-23
Payer: MEDICAID

## 2022-04-23 DIAGNOSIS — M32.9 LUPUS ARTHRITIS (HCC): ICD-10-CM

## 2022-04-23 LAB — U1 SNRNP IGG SER QL: 10 UNITS (ref 0–19)

## 2022-04-23 RX ORDER — HYDROXYCHLOROQUINE SULFATE 200 MG/1
200 TABLET, FILM COATED ORAL DAILY
Qty: 30 TABLET | Refills: 0 | Status: SHIPPED | OUTPATIENT
Start: 2022-04-23 | End: 2022-05-27 | Stop reason: SDUPTHER

## 2022-04-23 RX ORDER — PREDNISONE 20 MG/1
20 TABLET ORAL DAILY
Qty: 17 TABLET | Refills: 0 | Status: SHIPPED | OUTPATIENT
Start: 2022-04-23 | End: 2022-05-27 | Stop reason: SDUPTHER

## 2022-04-23 RX ORDER — OMEPRAZOLE 20 MG/1
20 CAPSULE, DELAYED RELEASE ORAL DAILY
Qty: 17 CAPSULE | Refills: 0 | Status: SHIPPED | OUTPATIENT
Start: 2022-04-23 | End: 2022-05-10

## 2022-04-24 LAB — DSDNA IGG TITR SER CLIF: ABNORMAL {TITER}

## 2022-04-24 NOTE — TELEPHONE ENCOUNTER
I called the patient and informed her that she has a new diagnosis of Lupus based off her labs (VELMA 1:1280 with + anti-DNA Ds (ACR/EULAR critieria met with 12 points: 6 pts for joint involvement of bilateral knee and and wrist pain). Patient reports her mother was diagnosed at age 51 with Lupus and Rheumatoid arthritis. Informed patient I would refer her to Rheumatology, Ophthalmology, and put in lab orders (CBC,CMP, ESR,CRP, C3, C4, UA, urine microalbumin/creatinine) and start her on Hydroxychloroquine as well as Prednisone and Omeprazole (prevent ulcers), and have her follow up with me as scheduled 5/10/22. Patient given the number to call if not called for her appointments (Rheumatology,Ophthlamology) in 2 weeks if not called at [phone]. The patient verbalized an understanding of these instructions. All questions answered.      Based on evidenced-based medicine, Hydroxychloroquine reduces mortality and disease flare, and Prednisone improves joint pain. Will readdress patient's clinical response 5/10/22 appointment.

## 2022-04-27 ENCOUNTER — TELEPHONE (OUTPATIENT)
Dept: INTERNAL MEDICINE | Facility: OTHER | Age: 32
End: 2022-04-27
Payer: MEDICAID

## 2022-04-27 DIAGNOSIS — M32.9 LUPUS ARTHRITIS (HCC): ICD-10-CM

## 2022-05-02 ENCOUNTER — TELEPHONE (OUTPATIENT)
Dept: INTERNAL MEDICINE | Facility: OTHER | Age: 32
End: 2022-05-02
Payer: MEDICAID

## 2022-05-09 ENCOUNTER — APPOINTMENT (OUTPATIENT)
Dept: LAB | Facility: MEDICAL CENTER | Age: 32
End: 2022-05-09
Payer: MEDICAID

## 2022-05-10 ENCOUNTER — TELEPHONE (OUTPATIENT)
Dept: INTERNAL MEDICINE | Facility: OTHER | Age: 32
End: 2022-05-10

## 2022-05-10 NOTE — TELEPHONE ENCOUNTER
Patient called stating she wasn't able to get her labs done before her appt. Is wondering if she should still come in for her appt today or should she reschedule?

## 2022-05-13 ENCOUNTER — TELEPHONE (OUTPATIENT)
Dept: INTERNAL MEDICINE | Facility: OTHER | Age: 32
End: 2022-05-13
Payer: MEDICAID

## 2022-05-13 NOTE — TELEPHONE ENCOUNTER
Tried to call the patient, but unable to leave a message. Call the patient, and have her complete blood work and follow up with me next month. Thank you.

## 2022-05-17 ENCOUNTER — TELEPHONE (OUTPATIENT)
Dept: INTERNAL MEDICINE | Facility: OTHER | Age: 32
End: 2022-05-17
Payer: MEDICAID

## 2022-05-17 NOTE — TELEPHONE ENCOUNTER
Cristo Rudolph Jr., M.D.  Krystle Rodriguez, Med Ass't  Caller: Unspecified (Today, 10:29 AM)  Patient needs to come into the clinic for an evaluation. Or go to an urgent care.

## 2022-05-17 NOTE — TELEPHONE ENCOUNTER
Does pt still have the Omeprazole- that should help with nausea- she missed her appointmt on 5/10- would rather see her sooner than her schedules  (6/16)- can she come in this week with someone else?  (She has a new diagnosis of Lupus- has she gotten the ref info for Rheum and Opthal- to schedule with them?)

## 2022-05-17 NOTE — TELEPHONE ENCOUNTER
She stated she didn't want to schedule anything else as she has a job and can't keep making doctor's appointments. She did receive referral information via [employer] and she read them, so she can schedule. Unsure if she wants to speak with me again since she was frustrated when I offered her an appointment to talk about her nausea

## 2022-05-17 NOTE — TELEPHONE ENCOUNTER
Patient called stating she has been experiencing nausea for about a week now. She would like to know if she can get an rx for something. I let her know she will probably need to schedule an appt for that but just in case, I will send a message out to Dr. Rudolph

## 2022-05-17 NOTE — TELEPHONE ENCOUNTER
Called patient and notified. She was frustrated at the fact that Dr. Rudolph knew the medication that was prescribed was going to cause nausea and now won't prescribe anything for her. She then states that she also has a job and can't keep missing her job for appointments that are pointless. I then apologized and let her know that that's what the doctor stated, she then said fuck this and hung up

## 2022-05-27 DIAGNOSIS — M32.9 LUPUS ARTHRITIS (HCC): ICD-10-CM

## 2022-05-27 RX ORDER — PREDNISONE 20 MG/1
20 TABLET ORAL DAILY
Qty: 17 TABLET | Refills: 0 | Status: SHIPPED | OUTPATIENT
Start: 2022-05-27 | End: 2022-06-13

## 2022-05-27 RX ORDER — HYDROXYCHLOROQUINE SULFATE 200 MG/1
TABLET, FILM COATED ORAL
Qty: 30 TABLET | Refills: 0 | Status: SHIPPED | OUTPATIENT
Start: 2022-05-27 | End: 2022-08-04

## 2022-05-27 NOTE — TELEPHONE ENCOUNTER
Last seen: 04.04.2022 by Dr. Rudolph  Next appt: 06.13.2022 with Dr. Rudolph    Was the patient seen in the last year in this department? Yes   Does patient have an active prescription for medications requested? No   Received Request Via: Pharmacy

## 2022-06-12 ENCOUNTER — HOSPITAL ENCOUNTER (EMERGENCY)
Facility: MEDICAL CENTER | Age: 32
End: 2022-06-12
Attending: EMERGENCY MEDICINE
Payer: MEDICAID

## 2022-06-12 VITALS
HEART RATE: 82 BPM | OXYGEN SATURATION: 96 % | HEIGHT: 60 IN | SYSTOLIC BLOOD PRESSURE: 128 MMHG | TEMPERATURE: 97.7 F | DIASTOLIC BLOOD PRESSURE: 89 MMHG | BODY MASS INDEX: 31.73 KG/M2 | RESPIRATION RATE: 18 BRPM | WEIGHT: 161.6 LBS

## 2022-06-12 DIAGNOSIS — G43.911 INTRACTABLE MIGRAINE WITH STATUS MIGRAINOSUS, UNSPECIFIED MIGRAINE TYPE: ICD-10-CM

## 2022-06-12 LAB — HCG SERPL QL: NEGATIVE

## 2022-06-12 PROCEDURE — A9270 NON-COVERED ITEM OR SERVICE: HCPCS | Performed by: EMERGENCY MEDICINE

## 2022-06-12 PROCEDURE — 700105 HCHG RX REV CODE 258: Performed by: EMERGENCY MEDICINE

## 2022-06-12 PROCEDURE — 36415 COLL VENOUS BLD VENIPUNCTURE: CPT

## 2022-06-12 PROCEDURE — 96374 THER/PROPH/DIAG INJ IV PUSH: CPT

## 2022-06-12 PROCEDURE — 99284 EMERGENCY DEPT VISIT MOD MDM: CPT

## 2022-06-12 PROCEDURE — 94760 N-INVAS EAR/PLS OXIMETRY 1: CPT

## 2022-06-12 PROCEDURE — 700102 HCHG RX REV CODE 250 W/ 637 OVERRIDE(OP): Performed by: EMERGENCY MEDICINE

## 2022-06-12 PROCEDURE — 700111 HCHG RX REV CODE 636 W/ 250 OVERRIDE (IP): Performed by: EMERGENCY MEDICINE

## 2022-06-12 PROCEDURE — 84703 CHORIONIC GONADOTROPIN ASSAY: CPT

## 2022-06-12 PROCEDURE — 96375 TX/PRO/DX INJ NEW DRUG ADDON: CPT

## 2022-06-12 RX ORDER — PROCHLORPERAZINE EDISYLATE 5 MG/ML
10 INJECTION INTRAMUSCULAR; INTRAVENOUS ONCE
Status: COMPLETED | OUTPATIENT
Start: 2022-06-12 | End: 2022-06-12

## 2022-06-12 RX ORDER — ACETAMINOPHEN 500 MG
1000 TABLET ORAL ONCE
Status: COMPLETED | OUTPATIENT
Start: 2022-06-12 | End: 2022-06-12

## 2022-06-12 RX ORDER — DIPHENHYDRAMINE HYDROCHLORIDE 50 MG/ML
50 INJECTION INTRAMUSCULAR; INTRAVENOUS ONCE
Status: COMPLETED | OUTPATIENT
Start: 2022-06-12 | End: 2022-06-12

## 2022-06-12 RX ORDER — KETOROLAC TROMETHAMINE 30 MG/ML
30 INJECTION, SOLUTION INTRAMUSCULAR; INTRAVENOUS ONCE
Status: DISCONTINUED | OUTPATIENT
Start: 2022-06-12 | End: 2022-06-12 | Stop reason: HOSPADM

## 2022-06-12 RX ORDER — SODIUM CHLORIDE 9 MG/ML
1000 INJECTION, SOLUTION INTRAVENOUS ONCE
Status: COMPLETED | OUTPATIENT
Start: 2022-06-12 | End: 2022-06-12

## 2022-06-12 RX ADMIN — DIPHENHYDRAMINE HYDROCHLORIDE 50 MG: 50 INJECTION, SOLUTION INTRAMUSCULAR; INTRAVENOUS at 03:16

## 2022-06-12 RX ADMIN — PROCHLORPERAZINE EDISYLATE 10 MG: 5 INJECTION INTRAMUSCULAR; INTRAVENOUS at 03:16

## 2022-06-12 RX ADMIN — SODIUM CHLORIDE 1000 ML: 9 INJECTION, SOLUTION INTRAVENOUS at 03:30

## 2022-06-12 RX ADMIN — ACETAMINOPHEN 1000 MG: 500 TABLET, FILM COATED ORAL at 03:30

## 2022-06-12 ASSESSMENT — PAIN DESCRIPTION - PAIN TYPE: TYPE: ACUTE PAIN

## 2022-06-12 ASSESSMENT — FIBROSIS 4 INDEX: FIB4 SCORE: 0.37

## 2022-06-12 NOTE — ED NOTES
"Patient ambulated to bathroom with a steady gait. Patient returned back to the nursing station and stated that the NS infusion was \"making her feel worse.\" Patient stated that the NS was causing increased nausea. Patient stated that she wanted the infusion to stop. DCd fluids per patients request. Patient asked then her labs would return, advised that at the moment they were in progress. Patient removed cardiac monitor, BP cuff, and pulse ox, and refused to wear. MD aware.   "

## 2022-06-12 NOTE — ED PROVIDER NOTES
ED Provider Note  ED Provider Note    Scribed for Gil Barrett by Gil Barrett. 6/12/2022  3:03 AM    Primary care provider: Cristo Rudolph Jr., M.D.  Means of arrival: Private vehicle  History obtained from: Patient  History limited by: None    CHIEF COMPLAINT  Chief Complaint   Patient presents with   • Nausea     30 y/o female presents with a chief complaint of nausea, and headache x2 weeks. Patient stated that she has attempted to self treat with ibuprofen which temporarily relieved her headache. Patient denied hx of headache or migraines. Patient stated photophobia with the headaches. Patient denied any vomiting, fevers, chills, or abdominal pain.    • Headache       HPI  Salud Nathan is a 31 y.o. female who presents to the Emergency Department with past medical history of lupus well-controlled on hydroxychloroquine, presenting with 2 to 3 weeks of persistent daily random migraine headaches.  States they are bilateral, frontal, pounding, with associated photophobia, phonophobia, nausea without vomiting.  Has history of headaches in the past of similar nature.  Never this persistent prolonged period.  He denies cough, shortness of breath, chest pain, abdominal pain, vomiting, diarrhea, constipation, vaginal discharge, dysuria, hematuria.  Does smoke cigarettes.  Denies alcohol or drug use.  Review of systems otherwise negative.  Quality: Aching  Duration: 3 weeks  Severity: Moderate  Associated sx: Nausea    REVIEW OF SYSTEMS  As above, all other systems reviewed and are negative.   See HPI for further details.     PAST MEDICAL HISTORY   has a past medical history of Depression, Insomnia due to anxiety and fear, Lupus (HCC), and Psychiatric disorder.  SURGICAL HISTORY   has a past surgical history that includes tubal ligation and leep (2015).  SOCIAL HISTORY  Social History     Tobacco Use   • Smoking status: Current Every Day Smoker     Packs/day: 0.50     Years: 13.00     Pack years:  6.50     Types: Cigarettes   • Smokeless tobacco: Never Used   • Tobacco comment: 1/2 pack a day, states could be less   Vaping Use   • Vaping Use: Never used   Substance Use Topics   • Alcohol use: Yes     Comment: occasional, socially   • Drug use: Never      Social History     Substance and Sexual Activity   Drug Use Never     FAMILY HISTORY  History reviewed. No pertinent family history.  CURRENT MEDICATIONS  Home Medications     Reviewed by Cynthia Hills R.N. (Registered Nurse) on 06/12/22 at 0233  Med List Status: Not Addressed   Medication Last Dose Status   hydroxychloroquine (PLAQUENIL) 200 MG Tab  Active   hydrOXYzine HCl (ATARAX) 25 MG Tab  Active   ibuprofen (MOTRIN) 200 MG Tab  Active   predniSONE (DELTASONE) 20 MG Tab  Active              ALLERGIES  No Known Allergies    PHYSICAL EXAM    VITAL SIGNS:   Vitals:    06/12/22 0204   BP: 128/89   Pulse: 82   Resp: 18   Temp: 36.5 °C (97.7 °F)   TempSrc: Temporal   SpO2: 96%   Weight: 73.3 kg (161 lb 9.6 oz)   Height: 1.524 m (5')       Vitals: My interpretation: normotensive, not tachycardic, afebrile, not hypoxic    Reinterpretation of vitals: Unchanged    Cardiac Monitor Interpretation: The cardiac monitor revealed normal Sinus Rhythm as interpreted by me. The cardiac monitor was ordered secondary to the patient's history of headache and to monitor for dysrhythmia and/or tachycardia.    PE:   Constitutional: Well developed, Well nourished, No acute distress, Non-toxic appearance.   HENT: Normocephalic, Atraumatic, Bilateral external ears normal, Oropharynx is clear mucous membranes are moist. No oral exudates or nasal discharge.   Eyes: Pupils are equal round and reactive, EOMI, Conjunctiva normal, No discharge.   Neck: Normal range of motion, No tenderness, Supple, No stridor. No meningismus.  Lymphatic: No lymphadenopathy noted.   Cardiovascular: Regular rate and rhythm without murmur rub or gallop.  Thorax & Lungs: Clear breath sounds bilaterally  without wheezes, rhonchi or rales. There is no chest wall tenderness.   Abdomen: Soft non-tender non-distended. There is no rebound or guarding. No organomegaly is appreciated. Bowel sounds are normal.  Skin: Normal without rash.   Back: No CVA or spinal tenderness.   Extremities: Intact distal pulses, No edema, No tenderness, No cyanosis, No clubbing. Capillary refill is less than 2 seconds.  Musculoskeletal: Good range of motion in all major joints. No tenderness to palpation or major deformities noted.   Neurologic: Alert & oriented x 3, Normal motor function, Normal sensory function, No focal deficits noted. Reflexes are normal.  Psychiatric: Affect normal, Judgment normal, Mood normal. There is no suicidal ideation or patient reported hallucinations.     DIAGNOSTIC STUDIES / PROCEDURES    RADIOLOGY  No orders to display     The radiologist's interpretation of all radiological studies have been reviewed by me.    COURSE & MEDICAL DECISION MAKING  Nursing notes, VS, PMSFHx, labs, imaging, EKG reviewed in chart.    MDM: 3:03 AM Salud Nathan is a 31 y.o. female who presented with persistent intractable migraine headache for the past 3 weeks.  Happens randomly throughout the day.  Last varyingly different periods of time.  Bilateral, frontal, pounding, associated photophobia, phonophobia, nausea.  History of similar in the past.  Vital signs unremarkable.  Physical exam within normal limits.  Nonfocal neurological exam.  Patient does appear uncomfortable however and was given headache cocktail with IV fluids, Toradol, Tylenol, Compazine and Benadryl.  She had significant improvement of her symptoms.  Recommend close outpatient follow-up with PCP for further evaluation treatment of her headaches.  She verbalized understanding plan and is amenable.  She is observed and has repeat evaluation after treatment, is ambulatory, well-appearing, no acute distress and has a repeat benign nonfocal neurological physical  exam.    FINAL IMPRESSION  1. Intractable migraine with status migrainosus, unspecified migraine type Acute      The note accurately reflects work and decisions made by me.  Gil Barrett  6/12/2022  3:05 AM

## 2022-06-12 NOTE — ED TRIAGE NOTES
Chief Complaint   Patient presents with   • Nausea     30 y/o female presents with a chief complaint of nausea, and headache x2 weeks. Patient stated that she has attempted to self treat with ibuprofen which temporarily relieved her headache. Patient denied hx of headache or migraines. Patient stated photophobia with the headaches. Patient denied any vomiting, fevers, chills, or abdominal pain.    • Headache     /89   Pulse 82   Temp 36.5 °C (97.7 °F) (Temporal)   Resp 18   Ht 1.524 m (5')   Wt 73.3 kg (161 lb 9.6 oz)   LMP  (LMP Unknown)   SpO2 96%   BMI 31.56 kg/m²     Patient is vaccinated against COVID 19.

## 2022-06-12 NOTE — DISCHARGE INSTRUCTIONS
I want to talk to your PCP about further treatments if you have continued or persistent migraines.  Try taking Tylenol or Motrin to help.  If you have worsening symptoms, please return to the ED for further evaluation and treatment.  Thank you for coming in today.

## 2022-06-13 ENCOUNTER — APPOINTMENT (OUTPATIENT)
Dept: INTERNAL MEDICINE | Facility: OTHER | Age: 32
End: 2022-06-13
Payer: MEDICAID

## 2022-06-13 ENCOUNTER — OFFICE VISIT (OUTPATIENT)
Dept: INTERNAL MEDICINE | Facility: OTHER | Age: 32
End: 2022-06-13

## 2022-06-13 VITALS
WEIGHT: 163 LBS | OXYGEN SATURATION: 98 % | SYSTOLIC BLOOD PRESSURE: 120 MMHG | DIASTOLIC BLOOD PRESSURE: 78 MMHG | TEMPERATURE: 98.5 F | HEART RATE: 92 BPM | HEIGHT: 60 IN | BODY MASS INDEX: 32 KG/M2

## 2022-06-13 DIAGNOSIS — M32.9 LUPUS ARTHRITIS (HCC): ICD-10-CM

## 2022-06-13 DIAGNOSIS — G43.119 INTRACTABLE MIGRAINE WITH AURA WITHOUT STATUS MIGRAINOSUS: ICD-10-CM

## 2022-06-13 DIAGNOSIS — F17.210 CIGARETTE NICOTINE DEPENDENCE WITHOUT COMPLICATION: ICD-10-CM

## 2022-06-13 DIAGNOSIS — R11.0 NAUSEA: ICD-10-CM

## 2022-06-13 PROCEDURE — 99214 OFFICE O/P EST MOD 30 MIN: CPT | Mod: GC | Performed by: STUDENT IN AN ORGANIZED HEALTH CARE EDUCATION/TRAINING PROGRAM

## 2022-06-13 RX ORDER — ONDANSETRON 4 MG/1
4 TABLET, FILM COATED ORAL EVERY 6 HOURS PRN
Qty: 20 TABLET | Refills: 1 | Status: SHIPPED | OUTPATIENT
Start: 2022-06-13 | End: 2022-07-03

## 2022-06-13 RX ORDER — SUMATRIPTAN 50 MG/1
50 TABLET, FILM COATED ORAL
Qty: 10 TABLET | Refills: 3 | Status: SHIPPED | OUTPATIENT
Start: 2022-06-13 | End: 2023-09-18 | Stop reason: SDUPTHER

## 2022-06-13 RX ORDER — PREDNISONE 5 MG/1
TABLET ORAL
Qty: 45 TABLET | Refills: 0 | Status: SHIPPED | OUTPATIENT
Start: 2022-06-13 | End: 2022-09-06

## 2022-06-13 ASSESSMENT — ENCOUNTER SYMPTOMS
WHEEZING: 0
SHORTNESS OF BREATH: 0
VOMITING: 0
MYALGIAS: 0
ABDOMINAL PAIN: 0
PALPITATIONS: 0
DIARRHEA: 0
WEAKNESS: 0
WEIGHT LOSS: 0
NAUSEA: 0
DOUBLE VISION: 0
CONSTIPATION: 0
DEPRESSION: 0
COUGH: 0
HEADACHES: 0
NERVOUS/ANXIOUS: 1
HEARTBURN: 0
BLURRED VISION: 0
DIZZINESS: 0
FEVER: 0
FALLS: 0
CHILLS: 0

## 2022-06-13 ASSESSMENT — FIBROSIS 4 INDEX: FIB4 SCORE: 0.37

## 2022-06-13 ASSESSMENT — LIFESTYLE VARIABLES: SUBSTANCE_ABUSE: 0

## 2022-06-13 NOTE — PATIENT INSTRUCTIONS
-Do labs 1 week before next visit.   -Keep taking hydroxychloroquine 200 mg daily  -Taper off prednisone 15 mg daily for 3 days, 10 mg daily for 3 days, stay on 5 mg daily until you see your rheumatologist.  -Sumatriptan as needed for your migraine attack  -zofran as needed for nausea without headache.

## 2022-06-13 NOTE — PROGRESS NOTES
Follow up visit    CC: follow up    HPI: This is 32 year old female with a history of suspected SLE and migraine presents for follow up visit. Was previously seen in [place] by PCP, but has not been seen for two years, and last visit was the first visit.    She states that her joint pain (R knee pain, hand pain) significantly improved with oral steroid. She has been having nausea in the morning since began taking hydroxychloroquine. The timing of nausea and migraine do not correlate so nausea is likely from hydroxychloroquine. She has been having migraine more frequently last couple months, and it occurs every other day.     she has a new diagnosis of Lupus based off her labs (VELMA 1:1280 with + anti-DNA Ds (ACR/EULAR critieria met with 12 points: 6 pts for joint involvement of bilateral knee and and wrist pain). Patient reports her mother was diagnosed at age 51 with Lupus and Rheumatoid arthritis. Referrals to Rheumatology, Ophthalmology were placed previously.    Reports stiffness. Denies rash, photosensitivity, nasal/oral/genital ulcers, alopecia, dry eyes/mouth, Raynaud's phenomenon, pleurisy, bloody or foamy of urination, thrombosis, leg swelling, nodules, eye inflammation, diarrhea, UTI, STDs, GERD/dysphagia, skin tightening/digital ulcers, dactylitis, seizures, psychosis.     Eye doctor appointment scheduled for June 27th  Rheumatology appointment  Scheduled for august 1st    Smoker: 1/2 PPD for 13 years.not ready to quit.       Pap smear: has not had a pap smear in the past 3 years.history of HPV with LEEP 2015.  Tubal ligation: 2015  Tdap: due  Pneumovax: due    Depression with anxiety: using as needed. Previously on Benzodiazepines. Has been on Hydroxyzine in the past that worked better. GAD7: 2, PHQ9-negative.    Patient Active Problem List    Diagnosis Date Noted   • Nausea 06/15/2022   • Migraine 06/15/2022   • Lupus arthritis (HCC) 04/23/2022   • Depression with anxiety 04/04/2022   • Cigarette  nicotine dependence without complication 04/04/2022   • Obesity (BMI 30-39.9) 04/04/2022       Past Medical History:   Diagnosis Date   • Depression    • Insomnia due to anxiety and fear    • Lupus (HCC)    • Psychiatric disorder        Current Outpatient Medications   Medication Sig Dispense Refill   • ondansetron (ZOFRAN) 4 MG Tab tablet Take 1 Tablet by mouth every 6 hours as needed for Nausea/Vomiting for up to 20 days. 20 Tablet 1   • SUMAtriptan (IMITREX) 50 MG Tab Take 1 Tablet by mouth one time as needed for Migraine for up to 1 dose. 10 Tablet 3   • predniSONE (DELTASONE) 5 MG Tab Take 3 Tablets by mouth every day for 3 days, THEN 2 Tablets every day for 3 days, THEN 1 Tablet every day for 30 days. 45 Tablet 0   • hydroxychloroquine (PLAQUENIL) 200 MG Tab TAKE 1 TABLET BY MOUTH EVERY DAY 30 Tablet 0   • ibuprofen (MOTRIN) 200 MG Tab Take 200 mg by mouth every 6 hours as needed.     • hydrOXYzine HCl (ATARAX) 25 MG Tab Take 1 Tablet by mouth 2 times a day as needed for Itching. 30 Tablet 1     No current facility-administered medications for this visit.       Allergies as of 06/13/2022   • (No Known Allergies)       Social History     Tobacco Use   • Smoking status: Current Every Day Smoker     Packs/day: 0.50     Years: 13.00     Pack years: 6.50     Types: Cigarettes   • Smokeless tobacco: Never Used   • Tobacco comment: 1/2 pack a day, states could be less   Vaping Use   • Vaping Use: Never used   Substance Use Topics   • Alcohol use: Yes     Comment: occasional, socially   • Drug use: Never       History reviewed. No pertinent family history.    Past Surgical History:   Procedure Laterality Date   • LEEP  2015   • TUBAL LIGATION           Review of Systems   Constitutional: Negative for chills, fever, malaise/fatigue and weight loss.   Eyes: Negative for blurred vision and double vision.   Respiratory: Negative for cough, shortness of breath and wheezing.    Cardiovascular: Negative for chest pain and  palpitations.   Gastrointestinal: Negative for abdominal pain, constipation, diarrhea, heartburn, nausea and vomiting.   Genitourinary: Negative for dysuria, frequency and urgency.   Musculoskeletal: Positive for joint pain. Negative for falls and myalgias.   Skin: Negative for rash.   Neurological: Negative for dizziness, weakness and headaches.   Psychiatric/Behavioral: Negative for depression, substance abuse and suicidal ideas. The patient is nervous/anxious.          /78 (BP Location: Right arm, Patient Position: Sitting, BP Cuff Size: Adult)   Pulse 92   Temp 36.9 °C (98.5 °F) (Temporal)   Ht 1.524 m (5')   Wt 73.9 kg (163 lb)   LMP  (LMP Unknown)   SpO2 98%   BMI 31.83 kg/m²       Physical Exam  Physical Exam  Vitals and nursing note reviewed.   Constitutional:       General: She is not in acute distress.     Appearance: Normal appearance. She is not ill-appearing.   HENT:      Head: Normocephalic and atraumatic.   Eyes:      Conjunctiva/sclera: Conjunctivae normal.   Cardiovascular:      Rate and Rhythm: Normal rate and regular rhythm.      Pulses: Normal pulses.      Heart sounds: Normal heart sounds. No murmur heard.    No friction rub. No gallop.   Pulmonary:      Effort: Pulmonary effort is normal. No respiratory distress.      Breath sounds: Normal breath sounds. No wheezing, rhonchi or rales.   Musculoskeletal:      Right hand: Tenderness and bony tenderness present. No swelling, deformity or lacerations. Normal range of motion. Normal strength. Normal sensation. There is no disruption of two-point discrimination. Normal capillary refill. Normal pulse.      Cervical back: Neck supple. No rigidity or tenderness.      Right knee: No deformity, effusion, erythema, ecchymosis, lacerations, bony tenderness or crepitus. Normal range of motion. No tenderness. No LCL laxity, MCL laxity, ACL laxity or PCL laxity. Abnormal patellar mobility. Normal alignment and normal meniscus. Normal pulse.       Instability Tests: Anterior drawer test negative. Posterior drawer test negative. Medial Alexander test negative and lateral Alexander test negative.      Comments: Right hand:mild TTP dorsal and volar aspect of the 2nd,3rd,4th fingers.   Skin:     General: Skin is dry.      Capillary Refill: Capillary refill takes less than 2 seconds.      Findings: No rash.   Neurological:      Mental Status: She is alert and oriented to person, place, and time. Mental status is at baseline.   Psychiatric:         Mood and Affect: Mood normal.         Behavior: Behavior normal.         Note: I have reviewed all pertinent labs and diagnostic tests associated with this visit with specific comments listed under the assessment and plan below    Assessment and Plan    1. Lupus arthritis (HCC)  she has a new diagnosis of Lupus based off her labs (VELMA 1:1280 with + anti-DNA Ds (ACR/EULAR critieria met with 12 points: 6 pts for joint involvement of bilateral knee and and wrist pain). Patient reports her mother was diagnosed at age 51 with Lupus and Rheumatoid arthritis. Referrals to Rheumatology, Ophthalmology were placed previously.  Will hold off on increasing hydroxychloroquine dose due to nausea.    Plan:  -Taper off prednisone 15 mg daily for 3 days, 10 mg daily for 3 days, stay on 5 mg daily until you see your rheumatologist.  -continue hydroxychloroquine 200 mg daily  -Follow up with rheumatology as scheduled  -Follow up with ophthalmology as scheduled      2. Nausea  -likely due to hydroxychloroquine. Will hold off on increasing hydroxychloroquine dose.    Plan:  -zofran as needed for nausea without headache.    3. Intractable migraine with aura without status migrainosus  -chronic. Worsening recently. Once her other medical conditions and symptoms are better under control, will consider starting venlafaxine especially since pt has anxiety.    Plan:  -Sumatriptan prn for migraine attack.    4. Cigarette nicotine dependence without  complication  Not ready to quit at this time. Pt understands that smoking can make current symptoms and medical conditions worse particularly lupus and nausea.  Smoking cessation counseling has been provided      Followup: Return in about 5 weeks (around 7/18/2022).        Signed by: Wesly Giraldo M.D.

## 2022-06-15 PROBLEM — G43.909 MIGRAINE: Status: ACTIVE | Noted: 2022-06-15

## 2022-06-15 PROBLEM — R11.0 NAUSEA: Status: ACTIVE | Noted: 2022-06-15

## 2022-07-25 ENCOUNTER — OFFICE VISIT (OUTPATIENT)
Dept: INTERNAL MEDICINE | Facility: OTHER | Age: 32
End: 2022-07-25
Payer: MEDICAID

## 2022-07-25 VITALS
HEART RATE: 86 BPM | WEIGHT: 160 LBS | BODY MASS INDEX: 31.41 KG/M2 | SYSTOLIC BLOOD PRESSURE: 109 MMHG | DIASTOLIC BLOOD PRESSURE: 64 MMHG | HEIGHT: 60 IN | OXYGEN SATURATION: 98 % | TEMPERATURE: 98.1 F

## 2022-07-25 DIAGNOSIS — D50.9 MICROCYTIC ANEMIA: ICD-10-CM

## 2022-07-25 DIAGNOSIS — M32.9 LUPUS ARTHRITIS (HCC): ICD-10-CM

## 2022-07-25 DIAGNOSIS — K21.9 GASTROESOPHAGEAL REFLUX DISEASE, UNSPECIFIED WHETHER ESOPHAGITIS PRESENT: ICD-10-CM

## 2022-07-25 DIAGNOSIS — R10.30 LOWER ABDOMINAL PAIN: ICD-10-CM

## 2022-07-25 PROCEDURE — 99214 OFFICE O/P EST MOD 30 MIN: CPT | Mod: GC | Performed by: STUDENT IN AN ORGANIZED HEALTH CARE EDUCATION/TRAINING PROGRAM

## 2022-07-25 RX ORDER — POLYETHYLENE GLYCOL 3350 17 G/17G
17 POWDER, FOR SOLUTION ORAL DAILY
Qty: 116 G | Refills: 1 | Status: SHIPPED | OUTPATIENT
Start: 2022-07-25 | End: 2022-11-30

## 2022-07-25 RX ORDER — BISACODYL 10 MG
10 SUPPOSITORY, RECTAL RECTAL PRN
Qty: 4 SUPPOSITORY | Refills: 1 | Status: SHIPPED | OUTPATIENT
Start: 2022-07-25 | End: 2022-11-30

## 2022-07-25 RX ORDER — OMEPRAZOLE/SODIUM BICARBONATE 20MG-1.1G
1 CAPSULE ORAL DAILY
Qty: 28 CAPSULE | COMMUNITY
Start: 2022-07-25 | End: 2022-11-30

## 2022-07-25 ASSESSMENT — FIBROSIS 4 INDEX: FIB4 SCORE: 0.38

## 2022-07-25 NOTE — PROGRESS NOTES
Follow up visit    CC: abdominal pain    HPI:   This is 32 year old female with a history of suspected SLE and migraine who is presenting with 5 day history of lower abdominal pain.    #abdominal pain  #chronic heart burn    5 days ago began. No sick contact. No new medication, diet, supplement. No trauma.  In lower abdomen. At first, gassy. A little of heartburn and nauseous.  No heartburn immediately after meals.  Abdominal discomfort turned into pain 3 days ago. Crampy, constant.  4/10. Did not start period yet and last period was 1 month ago.  Normally has regular period every month. Last bowel movement today and had small amount of stool. The bowel movement before this was 2 days ago. Feels constipated and feels more like pressure in lower abdomen. Eating and drinking well. Unchanged progression of pain. Has not had pain like this before. No fever, chills. No chest pain, shortness of breath, palpitation. No headache.   Gets UTI often. No burning or pain on urination. No increased urgency or frequency. No vaginal discharge or bleeding. No dyspareunia. Normally does not have heavy or painful menstruation. Has a bowel movement every 2 days normally. No blood in stools, melena.  Last period was 1 month ago.    Nausea resolved.    Had eye doctor appointment on June 27th, retina looked fine on hydroxychloroquine  Rheumatology appointment  Scheduled for august 1st    Had been stressed due to finance issue for 5 months.stress did not get worse      Smoker: 1/2 PPD for 13 years. not ready to quit , declined quitting      Pap smear: last pap smear was this year 2022 .history of HPV with LEEP 2015.  Tubal ligation: 2015  Tdap: due  Pneumovax: due        Patient Active Problem List    Diagnosis Date Noted   • Lower abdominal pain 07/26/2022   • Gastroesophageal reflux disease 07/26/2022   • Nausea 06/15/2022   • Migraine 06/15/2022   • Lupus arthritis (HCC) 04/23/2022   • Depression with anxiety 04/04/2022   • Cigarette  nicotine dependence without complication 04/04/2022   • Obesity (BMI 30-39.9) 04/04/2022       Past Medical History:   Diagnosis Date   • Depression    • Insomnia due to anxiety and fear    • Lupus (HCC)    • Psychiatric disorder        Current Outpatient Medications   Medication Sig Dispense Refill   • bisacodyl (DULCOLAX) 10 MG Suppos Insert 1 Suppository into the rectum as needed (constipation). 4 Suppository 1   • polyethylene glycol 3350 (MIRALAX) 17 GM/SCOOP Powder Take 17 g by mouth every day. 116 g 1   • Omeprazole-Sodium Bicarbonate (ZEGERID OTC)  MG Cap Take 1 Capsule by mouth every day. 28 Capsule    • SUMAtriptan (IMITREX) 50 MG Tab Take 1 Tablet by mouth one time as needed for Migraine for up to 1 dose. 10 Tablet 3   • hydroxychloroquine (PLAQUENIL) 200 MG Tab TAKE 1 TABLET BY MOUTH EVERY DAY 30 Tablet 0   • ibuprofen (MOTRIN) 200 MG Tab Take 200 mg by mouth every 6 hours as needed.     • hydrOXYzine HCl (ATARAX) 25 MG Tab Take 1 Tablet by mouth 2 times a day as needed for Itching. 30 Tablet 1     No current facility-administered medications for this visit.       Allergies as of 07/25/2022   • (No Known Allergies)       Social History     Tobacco Use   • Smoking status: Current Every Day Smoker     Packs/day: 0.50     Years: 13.00     Pack years: 6.50     Types: Cigarettes   • Smokeless tobacco: Never Used   • Tobacco comment: 1/2 pack a day, states could be less   Vaping Use   • Vaping Use: Never used   Substance Use Topics   • Alcohol use: Yes     Comment: occasional, socially   • Drug use: Never       No family history on file.    Past Surgical History:   Procedure Laterality Date   • LEEP  2015   • TUBAL LIGATION           ROS:  See HPI    /64 (BP Location: Left arm, Patient Position: Sitting, BP Cuff Size: Small adult)   Pulse 86   Temp 36.7 °C (98.1 °F) (Temporal)   Ht 1.524 m (5')   Wt 72.6 kg (160 lb)   SpO2 98%   BMI 31.25 kg/m²     Physical Exam  Constitutional:        General: She is not in acute distress.     Appearance: She is not ill-appearing or diaphoretic.   HENT:      Head: Normocephalic.      Right Ear: External ear normal.      Left Ear: External ear normal.      Nose: Nose normal. No rhinorrhea.      Mouth/Throat:      Pharynx: No oropharyngeal exudate.   Eyes:      General: No scleral icterus.        Right eye: No discharge.         Left eye: No discharge.      Extraocular Movements: Extraocular movements intact.   Cardiovascular:      Rate and Rhythm: Normal rate and regular rhythm.      Heart sounds: Normal heart sounds. No murmur heard.  Pulmonary:      Effort: Pulmonary effort is normal. No respiratory distress.      Breath sounds: Normal breath sounds. No wheezing or rales.   Abdominal:      General: Abdomen is flat. There is no distension.      Palpations: Abdomen is soft.      Tenderness: There is abdominal tenderness. There is no guarding or rebound.      Comments: Tender to palpation in lower abdomen to suprapubic regions bilaterally. Tender to palpation in midepigastric region. No rodriguez sign.    Musculoskeletal:         General: No swelling.      Cervical back: Normal range of motion and neck supple.      Right lower leg: No edema.      Left lower leg: No edema.   Skin:     General: Skin is warm and dry.      Capillary Refill: Capillary refill takes less than 2 seconds.      Coloration: Skin is not jaundiced.   Neurological:      General: No focal deficit present.      Mental Status: She is alert and oriented to person, place, and time. Mental status is at baseline.   Psychiatric:         Mood and Affect: Mood normal.         Behavior: Behavior normal.         Thought Content: Thought content normal.         Judgment: Judgment normal.       Note: I have reviewed all pertinent labs and diagnostic tests associated with this visit with specific comments listed under the assessment and plan below    Assessment and Plan  .  #Lower abdominal pain  -Likely due to  constipation and possible gastritis, peptic ulcer disease. Other less likely differentials include ovarian cysts, ovarian tumors, UTI, fibroids, appendicitis, lupus serositis, adhesions, IBS.  -5 days ago began. No sick contact. No new medication, diet, supplement. No trauma.  In lower abdomen. At first, gassy. A little of heartburn and nauseous.  No heartburn immediately after meals.  Abdominal discomfort turned into pain 3 days ago. Crampy, constant.  4/10. Did not start period yet and last period was 1 month ago.  Normally has regular period every month. Last bowel movement today and had small amount of stool. The bowel movement before this was 2 days ago. Feels constipated and feels more like pressure in lower abdomen. Eating and drinking well. Unchanged progression of pain. Has not had pain like this before. No fever, chills. No burning or pain on urination. No increased urgency or frequency. No vaginal discharge or bleeding. No dyspareunia. Normally does not have heavy or painful menstruation. Has a bowel movement every 2 days normally. No blood in stools, melena. No peritoneal sign.      Plan:  -bisacodyl suppository prn  -miralax prn   -increase high fiber diet. Increase fruits and vegetables.    #heartburn  -instructed to discontinue ibuprofen  -eventually we can try switching from zegerid to pepcid or some rodrigo.      #Lupus arthritis (HCC)  -stable on current regimen  she has a new diagnosis of Lupus based off her labs (VELMA 1:1280 with + anti-DNA Ds (ACR/EULAR critieria met with 12 points: 6 pts for joint involvement of bilateral knee and and wrist pain). Patient reports her mother was diagnosed at age 51 with Lupus and Rheumatoid arthritis. Referrals to Rheumatology, Ophthalmology were placed previously.  Will hold off on increasing hydroxychloroquine dose due to nausea history with higher dose. Tapered off prednisone and now on 5 mg daily until seeing rheumatologist.  -following with ophthalmologist for  "hydroxychloroquine    Plan:  -continue prednisone 5 mg daily  -continue hydroxychloroquine 200 mg daily  -Follow up with rheumatology as scheduled      #Microcytic anemia  -no sign of active bleeding  - IRON/TOTAL IRON BIND; Future  - FERRITIN; Future      #Intractable migraine with aura without status migrainosus  -nausea resolved  -chronic. Worsening recently. Once her other medical conditions and symptoms are better under control, will consider starting venlafaxine especially since pt has anxiety.    Plan:  -Sumatriptan prn for migraine attack.    #Cigarette nicotine dependence without complication  Not ready to quit at this time. Pt understands that smoking can make current symptoms and medical conditions worse particularly lupus and nausea.  Smoking cessation counseling has been provided    \"Other chronic problems not addressed during this visit are listed below:    #Depression with anxiety:   -Previously on Benzodiazepines. Has been on Hydroxyzine in the past that worked better. GAD7: 2, PHQ9-negative.    Followup: Return in about 4 weeks (around 8/22/2022).        Signed by: Wesly Giraldo M.D.    "

## 2022-07-25 NOTE — PATIENT INSTRUCTIONS
-Take miralax until your pain improves. Also take suppository.  -If you dont get better by the end of this week, let us know next week  -Do labs 1 week before next visit.   -eventually we can try switching from zegerid to pepcid or some rodrigo.  -discontinue ibuprofen  -increase high fiber diet. Increase fruits and vegetables.

## 2022-07-26 PROBLEM — R10.30 LOWER ABDOMINAL PAIN: Status: ACTIVE | Noted: 2022-07-26

## 2022-07-26 PROBLEM — K21.9 GASTROESOPHAGEAL REFLUX DISEASE: Status: ACTIVE | Noted: 2022-07-26

## 2022-08-03 DIAGNOSIS — M32.9 LUPUS ARTHRITIS (HCC): ICD-10-CM

## 2022-08-03 NOTE — TELEPHONE ENCOUNTER
Last seen: 07.25.2022 by Dr. Giraldo  Next appt: 09.06.2022 with Dr. Rudolph    Was the patient seen in the last year in this department? Yes   Does patient have an active prescription for medications requested? No   Received Request Via: Pharmacy

## 2022-08-04 RX ORDER — HYDROXYCHLOROQUINE SULFATE 200 MG/1
TABLET, FILM COATED ORAL
Qty: 30 TABLET | Refills: 0 | Status: SHIPPED | OUTPATIENT
Start: 2022-08-04 | End: 2022-11-30 | Stop reason: SDUPTHER

## 2022-08-08 PROBLEM — R11.0 NAUSEA: Status: RESOLVED | Noted: 2022-06-15 | Resolved: 2022-08-08

## 2022-08-08 PROBLEM — R10.30 LOWER ABDOMINAL PAIN: Status: RESOLVED | Noted: 2022-07-26 | Resolved: 2022-08-08

## 2022-08-12 DIAGNOSIS — F41.8 DEPRESSION WITH ANXIETY: ICD-10-CM

## 2022-08-12 RX ORDER — HYDROXYZINE HYDROCHLORIDE 25 MG/1
TABLET, FILM COATED ORAL
Qty: 30 TABLET | Refills: 1 | Status: SHIPPED | OUTPATIENT
Start: 2022-08-12 | End: 2022-11-30

## 2022-08-12 NOTE — TELEPHONE ENCOUNTER
Hydroxyzine Refill    Last seen: 7/25/22 by Dr. Giraldo  Next appt: 9/6/22 with Dr. Rudolph    Was the patient seen in the last year in this department? Yes   Does patient have an active prescription for medications requested? No   Received Request Via: Pharmacy

## 2022-09-06 RX ORDER — PREDNISONE 5 MG/1
TABLET ORAL
Qty: 45 TABLET | Refills: 0 | Status: SHIPPED | OUTPATIENT
Start: 2022-09-06 | End: 2022-11-30 | Stop reason: SDUPTHER

## 2022-09-06 NOTE — TELEPHONE ENCOUNTER
Last seen: 07/25/22 by Dr. Giraldo  Next appt: 09/06/22 with Dr. Rudolph    Was the patient seen in the last year in this department? Yes   Does patient have an active prescription for medications requested? No   Received Request Via: Pharmacy

## 2022-11-30 ENCOUNTER — OFFICE VISIT (OUTPATIENT)
Dept: INTERNAL MEDICINE | Facility: OTHER | Age: 32
End: 2022-11-30
Payer: MEDICAID

## 2022-11-30 VITALS
HEIGHT: 60 IN | DIASTOLIC BLOOD PRESSURE: 76 MMHG | SYSTOLIC BLOOD PRESSURE: 116 MMHG | TEMPERATURE: 97.6 F | BODY MASS INDEX: 33.45 KG/M2 | OXYGEN SATURATION: 96 % | WEIGHT: 170.4 LBS | HEART RATE: 87 BPM

## 2022-11-30 DIAGNOSIS — E66.9 OBESITY (BMI 30-39.9): ICD-10-CM

## 2022-11-30 DIAGNOSIS — G89.29 CHRONIC PAIN OF LEFT KNEE: ICD-10-CM

## 2022-11-30 DIAGNOSIS — M32.9 LUPUS ARTHRITIS (HCC): ICD-10-CM

## 2022-11-30 DIAGNOSIS — Z23 ENCOUNTER FOR IMMUNIZATION: ICD-10-CM

## 2022-11-30 DIAGNOSIS — M25.562 CHRONIC PAIN OF LEFT KNEE: ICD-10-CM

## 2022-11-30 DIAGNOSIS — F17.210 CIGARETTE NICOTINE DEPENDENCE WITHOUT COMPLICATION: ICD-10-CM

## 2022-11-30 PROBLEM — K21.9 GASTROESOPHAGEAL REFLUX DISEASE: Status: RESOLVED | Noted: 2022-07-26 | Resolved: 2022-11-30

## 2022-11-30 PROBLEM — F41.8 DEPRESSION WITH ANXIETY: Status: RESOLVED | Noted: 2022-04-04 | Resolved: 2022-11-30

## 2022-11-30 PROCEDURE — 90686 IIV4 VACC NO PRSV 0.5 ML IM: CPT | Performed by: INTERNAL MEDICINE

## 2022-11-30 PROCEDURE — 99214 OFFICE O/P EST MOD 30 MIN: CPT | Mod: 25,GC | Performed by: GENERAL PRACTICE

## 2022-11-30 PROCEDURE — 90471 IMMUNIZATION ADMIN: CPT | Performed by: INTERNAL MEDICINE

## 2022-11-30 RX ORDER — PREDNISONE 5 MG/1
5 TABLET ORAL DAILY
Qty: 30 TABLET | Refills: 2 | Status: SHIPPED | OUTPATIENT
Start: 2022-11-30 | End: 2022-12-30

## 2022-11-30 RX ORDER — HYDROXYCHLOROQUINE SULFATE 200 MG/1
200 TABLET, FILM COATED ORAL
Qty: 90 TABLET | Refills: 1 | Status: SHIPPED | OUTPATIENT
Start: 2022-11-30 | End: 2023-05-30

## 2022-11-30 ASSESSMENT — ENCOUNTER SYMPTOMS
WEIGHT LOSS: 0
DIZZINESS: 0
DOUBLE VISION: 0
SORE THROAT: 0
MYALGIAS: 0
BLURRED VISION: 0
WEAKNESS: 0
PALPITATIONS: 0
HEADACHES: 0
HEARTBURN: 0
SHORTNESS OF BREATH: 0
WHEEZING: 0
NAUSEA: 0
VOMITING: 0
ABDOMINAL PAIN: 0
FEVER: 0
DIARRHEA: 0
FALLS: 0
CONSTIPATION: 0
NERVOUS/ANXIOUS: 0
DEPRESSION: 0
CHILLS: 0
COUGH: 0

## 2022-11-30 ASSESSMENT — LIFESTYLE VARIABLES: SUBSTANCE_ABUSE: 0

## 2022-11-30 ASSESSMENT — FIBROSIS 4 INDEX: FIB4 SCORE: 0.38

## 2022-11-30 NOTE — PROGRESS NOTES
Established Patient    Salud presents today with the following:    CC: follow up    HPI: 32 year old female, presents for a follow up appointment.    Lupus: Followed by Rheumatology in [place] seen initially August 2022. Taking Hydroxychloroquine 200mg daily and Prednisone 5mg which the Rheumatologist  and Ibuprofen 200mg Q6H PRN. Next appointment with Rheumatology within 2 weeks. Pain in knees.lipid panel wnl April 2022.Seen by Ophthalmology July 2022 and no concerns with follow up in three years. Patient denies rash, with no fatigue, weight loss, oral ulcers, or hair loss. Pain mostly in both knees and hands, but improved on medication.Needs a refill of Hydroxychloroquine and Prednisone.    Smoker: 1/22 PPD for 13 years. Not ready to quit.   Migraine:Imitrex PRN. Less than monthly.     Obesity: BMI 33. Trying to exercise, works at [employer].    The patient reports she has tested for Hep C/HIV in the past and was negative.    Pap smear: normal in March 2022. Has had HPV in 2015 s/p LEEP and has had normal pap smears since then.    Patient Active Problem List    Diagnosis Date Noted    Left knee pain 11/30/2022    Migraine 06/15/2022    Lupus arthritis (HCC) 04/23/2022    Cigarette nicotine dependence without complication 04/04/2022    Obesity (BMI 30-39.9) 04/04/2022       Social History     Tobacco Use    Smoking status: Every Day     Packs/day: 0.50     Years: 13.00     Pack years: 6.50     Types: Cigarettes    Smokeless tobacco: Never    Tobacco comments:     1/2 pack a day, states could be less   Vaping Use    Vaping Use: Never used   Substance Use Topics    Alcohol use: Yes     Comment: occasional, socially    Drug use: Never       Current Outpatient Medications   Medication Sig Dispense Refill    hydroxychloroquine (PLAQUENIL) 200 MG Tab Take 1 Tablet by mouth every day for 90 days. 90 Tablet 1    predniSONE (DELTASONE) 5 MG Tab Take 1 Tablet by mouth every day for 30 days. 30 Tablet 2    SUMAtriptan  (IMITREX) 50 MG Tab Take 1 Tablet by mouth one time as needed for Migraine for up to 1 dose. 10 Tablet 3    ibuprofen (MOTRIN) 200 MG Tab Take 200 mg by mouth every 6 hours as needed.       No current facility-administered medications for this visit.       Review of Systems   Constitutional:  Negative for chills, fever, malaise/fatigue and weight loss.   HENT:  Negative for congestion and sore throat.    Eyes:  Negative for blurred vision and double vision.   Respiratory:  Negative for cough, shortness of breath and wheezing.    Cardiovascular:  Negative for chest pain and palpitations.   Gastrointestinal:  Negative for abdominal pain, constipation, diarrhea, heartburn, nausea and vomiting.   Genitourinary:  Negative for dysuria, frequency and urgency.   Musculoskeletal:  Positive for joint pain. Negative for falls and myalgias.   Skin:  Negative for rash.   Neurological:  Negative for dizziness, weakness and headaches.   Psychiatric/Behavioral:  Negative for depression, substance abuse and suicidal ideas. The patient is not nervous/anxious.        /76 (BP Location: Left arm, Patient Position: Sitting, BP Cuff Size: Adult)   Pulse 87   Temp 36.4 °C (97.6 °F) (Temporal)   Ht 1.524 m (5')   Wt 77.3 kg (170 lb 6.4 oz)   SpO2 96%   BMI 33.28 kg/m²       PHYSICAL EXAM:  Physical Exam  Vitals and nursing note reviewed.   Constitutional:       General: She is not in acute distress.     Appearance: Normal appearance. She is not ill-appearing.   HENT:      Head: Normocephalic and atraumatic.      Mouth/Throat:      Mouth: Mucous membranes are moist.      Pharynx: No oropharyngeal exudate or posterior oropharyngeal erythema.      Comments: No ulcers appreciated  Cardiovascular:      Rate and Rhythm: Normal rate and regular rhythm.      Pulses: Normal pulses.      Heart sounds: Normal heart sounds. No murmur heard.    No friction rub. No gallop.   Pulmonary:      Effort: Pulmonary effort is normal. No respiratory  distress.      Breath sounds: Normal breath sounds. No wheezing, rhonchi or rales.   Abdominal:      General: There is no distension.      Tenderness: There is no abdominal tenderness. There is no guarding.   Musculoskeletal:      Right hand: No swelling, deformity, tenderness or bony tenderness.      Left hand: No swelling, deformity, tenderness or bony tenderness.   Neurological:      Mental Status: She is alert and oriented to person, place, and time. Mental status is at baseline.   Psychiatric:         Mood and Affect: Mood normal.         Behavior: Behavior normal.       Note: I have reviewed all pertinent labs and diagnostic tests associated with this visit with specific comments listed under the assessment and plan below    Assessment and Plan    1. Lupus arthritis (HCC)  Controlled. Followed by Rheumatology in [place] with follow up within the next month. Continue Hydroxychloroquine and Prednisone as prescribed with no indication for PJP prophylaxis. Patient completed documentation to request records from Rheumatology. Ordered additional labs. Follow up in February 2023.  - LUPUS ANTICOAGULANT; Future  - ANTICARDIOLIPIN AB IGG,IGM,IGA; Future  - BETA-2 GLYCOPROTEIN I AB,G,A,M  - MICROALBUMIN CREAT RATIO URINE; Future  - COMPLEMENT C4; Future  - COMPLEMENT C3; Future  - Sed Rate; Future  - CRP QUANTITIVE (NON-CARDIAC); Future  - Comp Metabolic Panel; Future  - CBC WITH DIFFERENTIAL; Future  - FERRITIN; Future  - IRON/TOTAL IRON BIND; Future  - hydroxychloroquine (PLAQUENIL) 200 MG Tab; Take 1 Tablet by mouth every day for 90 days.  Dispense: 90 Tablet; Refill: 1  - predniSONE (DELTASONE) 5 MG Tab; Take 1 Tablet by mouth every day for 30 days.  Dispense: 30 Tablet; Refill: 2  - URINALYSIS; Future    2. Cigarette nicotine dependence without complication  Patient smokes 1/2 PPD for 13 years.   I discussed cessation with patient including starting on nicotine patch and/or gum on discharge.  I also discussed  medications to help with cessation with patient including Wellbutrin and Chantix, nicotine patches .  Smoking cessation discussed with patient for 4 minutes.Previous attempts to quit: never. Not ready to quit. Will readdress on follow up.    3. Chronic pain of left knee  - DX-KNEE 3 VIEWS LEFT; Future    4. Obesity (BMI 30-39.9)  - Patient identified as having weight management issue.  Appropriate orders and counseling given.    5. Encounter for immunization  - INFLUENZA VACCINE QUAD INJ (PF)      Followup: Return in about 2 months (around 1/30/2023).      Signed by: Cristo Rudolph Jr., M.D.

## 2022-11-30 NOTE — LETTER
[facility]  Cristo Rudolph Jr., M.D.  [address] [facility] [place] [address]  Fax: [phone]   Authorization for Release/Disclosure of   Protected Health Information   Name: JOSE L CYR : 1990 SSN: [id number]   Address: [address] [place], [place] [address]y  [facility] [address]  [place] [address] Phone:    [phone] (home)    I authorize the entity listed below to release/disclose the PHI below to:   [facility]/Cristo Rudolph Jr., M.D. and Cristo Rudolph Jr., M.D.   Provider or Entity Name:     Address   City, State, Zip   Phone:      Fax:     Reason for request: continuity of care   Information to be released:    [  ] LAST COLONOSCOPY,  including any PATH REPORT and follow-up  [  ] LAST FIT/COLOGUARD RESULT [  ] LAST DEXA  [  ] LAST MAMMOGRAM  [  ] LAST PAP  [  ] LAST LABS [  ] RETINA EXAM REPORT  [  ] IMMUNIZATION RECORDS  [  ] Release all info      [  ] Check here and initial the line next to each item to release ALL health information INCLUDING  _____ Care and treatment for drug and / or alcohol abuse  _____ HIV testing, infection status, or AIDS  _____ Genetic Testing    DATES OF SERVICE OR TIME PERIOD TO BE DISCLOSED: _____________  I understand and acknowledge that:  * This Authorization may be revoked at any time by you in writing, except if your health information has already been used or disclosed.  * Your health information that will be used or disclosed as a result of you signing this authorization could be re-disclosed by the recipient. If this occurs, your re-disclosed health information may no longer be protected by State or Federal laws.  * You may refuse to sign this Authorization. Your refusal will not affect your ability to obtain treatment.  * This Authorization becomes effective upon signing and will  on (date) __________.      If no date is indicated, this Authorization will  one (1) year from the signature date.    Name: Jose L Cyr    Signature:   Date:      11/30/2022       PLEASE FAX REQUESTED RECORDS BACK TO: [phone]

## 2022-12-01 PROBLEM — G43.909 MIGRAINE: Status: RESOLVED | Noted: 2022-06-15 | Resolved: 2022-12-01

## 2022-12-01 PROBLEM — M25.562 LEFT KNEE PAIN: Status: RESOLVED | Noted: 2022-11-30 | Resolved: 2022-12-01

## 2022-12-01 PROBLEM — G43.709 CHRONIC MIGRAINE WITHOUT AURA: Status: ACTIVE | Noted: 2022-12-01

## 2023-02-07 PROBLEM — M32.9 LUPUS ARTHRITIS (HCC): Status: RESOLVED | Noted: 2022-04-23 | Resolved: 2023-02-07

## 2023-02-07 PROBLEM — M32.9 LUPUS (HCC): Status: ACTIVE | Noted: 2023-02-07

## 2023-05-29 DIAGNOSIS — M32.9 LUPUS ARTHRITIS (HCC): ICD-10-CM

## 2023-05-30 RX ORDER — HYDROXYCHLOROQUINE SULFATE 200 MG/1
TABLET, FILM COATED ORAL
Qty: 90 TABLET | Refills: 1 | Status: SHIPPED | OUTPATIENT
Start: 2023-05-30 | End: 2023-07-06 | Stop reason: SDUPTHER

## 2023-05-31 NOTE — TELEPHONE ENCOUNTER
May 30, 2023  Cristo Rudolph Jr., M.D.  to Unr Im Shelby Ramachandran Ma    GW    5/30/23  6:33 PM  This patient is due for a 6 months follow up with me. Please schedule this patient with me during the week of clinic I'm here June 19-23. Thank you.

## 2023-06-01 NOTE — TELEPHONE ENCOUNTER
Unable to leave a voicemail for patient. But left a mychart message to schedule.   Unna Boot Text: An Unna boot was placed to help immobilize the limb and facilitate more rapid healing.

## 2023-07-05 ENCOUNTER — APPOINTMENT (OUTPATIENT)
Dept: INTERNAL MEDICINE | Facility: OTHER | Age: 33
End: 2023-07-05
Payer: MEDICAID

## 2023-07-06 ENCOUNTER — OFFICE VISIT (OUTPATIENT)
Dept: INTERNAL MEDICINE | Facility: OTHER | Age: 33
End: 2023-07-06
Payer: MEDICAID

## 2023-07-06 VITALS
BODY MASS INDEX: 35.35 KG/M2 | DIASTOLIC BLOOD PRESSURE: 83 MMHG | SYSTOLIC BLOOD PRESSURE: 127 MMHG | TEMPERATURE: 97.7 F | HEART RATE: 89 BPM | OXYGEN SATURATION: 98 % | WEIGHT: 181 LBS

## 2023-07-06 DIAGNOSIS — R42 DYSEQUILIBRIUM: ICD-10-CM
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
## 2023-07-06 DIAGNOSIS — Z11.3 ROUTINE SCREENING FOR STI (SEXUALLY TRANSMITTED INFECTION): ICD-10-CM

## 2023-07-06 DIAGNOSIS — M32.9 LUPUS ARTHRITIS (HCC): ICD-10-CM

## 2023-07-06 DIAGNOSIS — F41.8 DEPRESSION WITH ANXIETY: ICD-10-CM

## 2023-07-06 PROCEDURE — 3079F DIAST BP 80-89 MM HG: CPT | Mod: GC | Performed by: STUDENT IN AN ORGANIZED HEALTH CARE EDUCATION/TRAINING PROGRAM

## 2023-07-06 PROCEDURE — 3074F SYST BP LT 130 MM HG: CPT | Mod: GC | Performed by: STUDENT IN AN ORGANIZED HEALTH CARE EDUCATION/TRAINING PROGRAM

## 2023-07-06 PROCEDURE — 99214 OFFICE O/P EST MOD 30 MIN: CPT | Mod: GC | Performed by: STUDENT IN AN ORGANIZED HEALTH CARE EDUCATION/TRAINING PROGRAM

## 2023-07-06 RX ORDER — HYDROXYZINE HYDROCHLORIDE 25 MG/1
25 TABLET, FILM COATED ORAL 3 TIMES DAILY PRN
Qty: 30 TABLET | Refills: 0 | Status: SHIPPED | OUTPATIENT
Start: 2023-07-06 | End: 2023-09-18 | Stop reason: SDUPTHER

## 2023-07-06 RX ORDER — HYDROXYCHLOROQUINE SULFATE 200 MG/1
200 TABLET, FILM COATED ORAL
Qty: 90 TABLET | Refills: 0 | Status: SHIPPED | OUTPATIENT
Start: 2023-07-06

## 2023-07-06 RX ORDER — PREDNISONE 5 MG/1
5 TABLET ORAL
Qty: 30 TABLET | Refills: 1 | Status: SHIPPED | OUTPATIENT
Start: 2023-07-06 | End: 2023-08-07 | Stop reason: SDUPTHER

## 2023-07-06 ASSESSMENT — PATIENT HEALTH QUESTIONNAIRE - PHQ9: CLINICAL INTERPRETATION OF PHQ2 SCORE: 0

## 2023-07-06 ASSESSMENT — FIBROSIS 4 INDEX: FIB4 SCORE: 0.39

## 2023-07-06 NOTE — PATIENT INSTRUCTIONS
Thank you for visiting today!  Please follow-up in 10 weeks  Please follow-up on with Rheumatology in [facility], and the eye doctor.   Please get lab work done at least 5 days before next visit.   Please try and eat healthy, get at least 30 minutes of cardiovascular exercise a day to help keep your health as best as it can be.  If you have any questions or concerns please feel free to contact us at [phone].  If you feel like you are experiencing a medical emergency please seek immediate medical attention at an urgent care or in the emergency department.

## 2023-07-07 LAB — HBA1C MFR BLD: 5.3 % (ref ?–5.8)

## 2023-07-07 NOTE — PROGRESS NOTES
Established Patient    Patient Care Team:  Cristo Rudolph Jr., M.D. as PCP - General (Internal Medicine)    Salud Nathan is a 33 y.o. female who presents today with the following Chief Complaint(s): Follow up for Diagnoses of Dysequilibrium, Fall, initial encounter, Depression with anxiety, Lupus arthritis (HCC), and Routine screening for STI (sexually transmitted infection) were pertinent to this visit.    HPI:  Ms. Nathan is a very pleasant 33-year-old female with a past medical history significant for SLE on chronic steroids and Plaquenil, depression, obesity, chronic migraines, tobacco use [facility] previous PCP Dr. Rudolph has graduated.  Patient states she has not followed up with rheumatology and needs refills on her Plaquenil and prednisone, discussed that these are not common medications that we prescribe and given that she states she has slowly worsening symptoms of her lupus with worsening pain in her large joints, she likely would benefit greatly from continued follow-up with rheumatologist.  Her rheumatologist is in [place] but patient states her insurance will cover to send her to the appointments.  Patient also states that for at least the last 6 months she has had unsteadiness on her feet, patient walks fine, without any complaints about her gait, however patient states when she stops she noticed that she feels very unstable and often has to grab onto something.  Patient is fallen in all directions several times over the last few months.  Patient does not remember if there is any single inciting event however states that it something that has been going on for a while possibly even longer than the last 6 months.  Patient denies any numbness or tingling in the legs any vertigo any nausea or vomiting.  Sometimes when she is in the shower it is at its worse when she is still in the light is dim.  Rest of 14 point review of systems negative except for as  below.      ROS:     General: No fevers, chills, night sweats, weight loss or gain  HEENT: No hearing changes, vision changes, eye pain, ear pain, nasal discharge, sore throat  Neck: No swelling in neck  Pulmonary: No shortness of breath, cough, sputum, or hemoptysis  Cardiovascular: No chest pain, palpitations, or LE swelling  GI: No nausea, vomiting, diarrhea, constipation, abdominal pain, hematochezia or melena  : No dysuria or frequency  Neuro: No focal weakness, no general weakness, no headaches, no lightheadedness, no dizziness  Psych: No anxiety or depression    Past Medical History:   Diagnosis Date    Depression     Insomnia due to anxiety and fear     Lupus (HCC)     Psychiatric disorder      Social History     Tobacco Use    Smoking status: Every Day     Packs/day: 0.50     Years: 13.00     Pack years: 6.50     Types: Cigarettes    Smokeless tobacco: Never    Tobacco comments:     1/2 pack a day, states could be less   Vaping Use    Vaping Use: Never used   Substance Use Topics    Alcohol use: Yes     Comment: occasional, socially    Drug use: Never     Current Outpatient Medications   Medication Sig Dispense Refill    hydrOXYzine HCl (ATARAX) 25 MG Tab Take 1 Tablet by mouth 3 times a day as needed for Itching. 30 Tablet 0    hydroxychloroquine (PLAQUENIL) 200 MG Tab Take 1 Tablet by mouth every day. 90 Tablet 0    predniSONE (DELTASONE) 5 MG Tab Take 1 Tablet by mouth every day. 30 Tablet 1    SUMAtriptan (IMITREX) 50 MG Tab Take 1 Tablet by mouth one time as needed for Migraine for up to 1 dose. 10 Tablet 3    ibuprofen (MOTRIN) 200 MG Tab Take 200 mg by mouth every 6 hours as needed.       No current facility-administered medications for this visit.       Physical Exam:  /83 (BP Location: Right arm, Patient Position: Sitting, BP Cuff Size: Adult long)   Pulse 89   Temp 36.5 °C (97.7 °F) (Temporal)   Wt 82.1 kg (181 lb)   SpO2 98%   BMI 35.35 kg/m²   General: Well developed, well nourished  female, in no distress.  HEENT: NC/AT, PERRL, EOMI, no scleral icterus or conjunctival pallor, fair dentition/denture in, no nasal discharge or oral erythema or exudates.   Neck: Supple, No cervical or supraclavicular LAD  CV:RRR, no murmurs gallops or Rubs, no JVD  Pulm: LCAB, no crackles, rales, rhonchi, or wheezing  GI: Normal bowel sounds, abdomen soft, nontender, nondistended to deep or light palpation in all 4 quadrants, no HSM.  MSK: Radial and dorsalis pedis pulses 2+ and equal bilaterally, respectively.  Strength 5 out of 5 in upper and lower extremities.  No lower extremity edema  Neuro: Patient is alert and oriented x3, no focal deficits, grossly normal pronator drift and Romberg, normal gait, patient unsteady standing up after approximately 20 seconds.  Psych: Appropriate mood and affect       Assessment and Plan:   1. Dysequilibrium  2. Fall, initial encounter  Patient with a history of SLE, no changes in proprioception or concerns for peripheral neuropathy on examination.  Normal gait, some disequilibrium, seems to be somewhat worse on Romberg, negative pronator drift, no weakness in proximal muscles, no concerns for any focal deficits concerning for stroke.  Differential remains broad at this time, does not appear central.  Will rule out metabolic cause as below, seek to better control SLE and follow-up at next visit.  - Discussed fall precautions  - VITAMIN D,25 HYDROXY (DEFICIENCY); Future  - CBC WITH DIFFERENTIAL; Future  - Lipid Profile; Future  - VITAMIN B12; Future  - VITAMIN B1; Future    3. Depression with anxiety  Patient with a history of depression and anxiety, wishing for refill on hydroxyzine which has helped her in the past denies any SI or HI denies any features of MDD at this time.  Patient states that she does have some baseline anxiety and medications usually make it worse not interested in any long-term options at this time as she has many coping skills she has developed over the  years to help with her depression and anxiety.  - hydrOXYzine HCl (ATARAX) 25 MG Tab; Take 1 Tablet by mouth 3 times a day as needed for Itching.  Dispense: 30 Tablet; Refill: 0    4. Lupus arthritis (HCC)  History of SLE, established with rheumatologist in [facility] is only had 1 appointment has not followed up, appears to be slowly worsening no acute flare we will hold off on further laboratory work-up at this time.  - Follow-up with rheumatologist in [facility]  -Continue prednisone and Plaquenil  -Discussed risks of chronic prednisone use and how this is not an optimal long-term therapy, patient is agreeable to other options and discussing with rheumatology  -No early   - VITAMIN D,25 HYDROXY (DEFICIENCY); Future  - HEMOGLOBIN A1C; Future  - Comp Metabolic Panel; Future  - Lipid Profile; Future  - VITAMIN B12; Future  - VITAMIN B1; Future  - hydroxychloroquine (PLAQUENIL) 200 MG Tab; Take 1 Tablet by mouth every day.  Dispense: 90 Tablet; Refill: 0    5. Routine screening for STI (sexually transmitted infection)  At the end of the visit patient mentions that her partner is not faithful and she wishes for STI testing as below, denies any symptoms.  - Chlamydia/GC, PCR (Urine); Future  - RPR (SYPHILIS); Future  - HIV AG/AB COMBO ASSAY SCREENING; Future         Return in about 10 weeks (around 9/14/2023).    Patient Instructions   Thank you for visiting today!  Please follow-up in 10 weeks  Please follow-up on with Rheumatology in [facility], and the eye doctor.   Please get lab work done at least 5 days before next visit.   Please try and eat healthy, get at least 30 minutes of cardiovascular exercise a day to help keep your health as best as it can be.  If you have any questions or concerns please feel free to contact us at [phone].  If you feel like you are experiencing a medical emergency please seek immediate medical attention at an urgent care or in the emergency department.       Noelle Castro M.D. PGY  2  [facility]    This note was created using voice recognition software.  While every attempt is made to ensure accuracy of transcription, occasionally errors occur.

## 2023-07-25 DIAGNOSIS — E55.9 VITAMIN D DEFICIENCY: ICD-10-CM

## 2023-07-25 DIAGNOSIS — E51.9 THIAMINE DEFICIENCY: ICD-10-CM

## 2023-07-25 RX ORDER — LANOLIN ALCOHOL/MO/W.PET/CERES
100 CREAM (GRAM) TOPICAL DAILY
Qty: 30 TABLET | Refills: 3 | Status: SHIPPED | OUTPATIENT
Start: 2023-07-25

## 2023-07-25 NOTE — PROGRESS NOTES
#Vitamin B1 Def.   #Vitamin D def  -Replete and recheck  -Possible cause of long standing dysequilibrium

## 2023-07-26 RX ORDER — CHOLECALCIFEROL (VITAMIN D3) 1250 MCG
CAPSULE ORAL
Qty: 12 CAPSULE | OUTPATIENT
Start: 2023-07-26

## 2023-08-07 ENCOUNTER — TELEPHONE (OUTPATIENT)
Dept: INTERNAL MEDICINE | Facility: OTHER | Age: 33
End: 2023-08-07
Payer: MEDICAID

## 2023-08-07 DIAGNOSIS — M32.9 LUPUS ARTHRITIS (HCC): ICD-10-CM

## 2023-08-07 RX ORDER — PREDNISONE 5 MG/1
5 TABLET ORAL
Qty: 30 TABLET | Refills: 1 | Status: SHIPPED | OUTPATIENT
Start: 2023-08-07 | End: 2023-08-07 | Stop reason: SDUPTHER

## 2023-08-07 RX ORDER — PREDNISONE 5 MG/1
5 TABLET ORAL
Qty: 30 TABLET | Refills: 1 | Status: SHIPPED | OUTPATIENT
Start: 2023-08-07

## 2023-08-07 NOTE — TELEPHONE ENCOUNTER
Patient called stating medications were never sent from last visit with Dr Castro on 7/6.   Patient is needing refills of all medications from 7/6 and her prednisone from today to be sent to [facility] in [place].    [address], [place] [address]

## 2023-08-07 NOTE — TELEPHONE ENCOUNTER
Received request via: Pharmacy    Was the patient seen in the last year in this department? Yes    Does the patient have an active prescription (recently filled or refills available) for medication(s) requested? Yes. Filled 07/06/23 for 30 days and 1 refill  Does the patient have custodial Plus and need 100 day supply (blood pressure, diabetes and cholesterol meds only)? Patient does not have SCP

## 2023-08-28 NOTE — TELEPHONE ENCOUNTER
Pharmacy requested refill but unsure of which pharmacy to send to since it is different from preferred pharmacy tried to call pt but number is not service

## 2023-09-18 DIAGNOSIS — F41.8 DEPRESSION WITH ANXIETY: ICD-10-CM

## 2023-09-19 RX ORDER — SUMATRIPTAN 50 MG/1
50 TABLET, FILM COATED ORAL
Qty: 10 TABLET | Refills: 3 | Status: SHIPPED | OUTPATIENT
Start: 2023-09-19

## 2023-09-19 RX ORDER — HYDROXYZINE HYDROCHLORIDE 25 MG/1
25 TABLET, FILM COATED ORAL 3 TIMES DAILY PRN
Qty: 30 TABLET | Refills: 0 | Status: SHIPPED | OUTPATIENT
Start: 2023-09-19 | End: 2023-11-13 | Stop reason: SDUPTHER

## 2023-09-19 NOTE — TELEPHONE ENCOUNTER
Received request via: Pharmacy    Was the patient seen in the last year in this department? Yes  7/6/2023  Does the patient have an active prescription (recently filled or refills available) for medication(s) requested? No    Does the patient have FPC Plus and need 100 day supply (blood pressure, diabetes and cholesterol meds only)? No

## 2023-11-13 DIAGNOSIS — F41.8 DEPRESSION WITH ANXIETY: ICD-10-CM

## 2023-11-14 RX ORDER — HYDROXYZINE HYDROCHLORIDE 25 MG/1
25 TABLET, FILM COATED ORAL 3 TIMES DAILY PRN
Qty: 30 TABLET | Refills: 0 | Status: SHIPPED | OUTPATIENT
Start: 2023-11-14